# Patient Record
Sex: FEMALE | Race: WHITE | Employment: UNEMPLOYED | URBAN - METROPOLITAN AREA
[De-identification: names, ages, dates, MRNs, and addresses within clinical notes are randomized per-mention and may not be internally consistent; named-entity substitution may affect disease eponyms.]

---

## 2017-01-04 ENCOUNTER — ROUTINE PRENATAL (OUTPATIENT)
Dept: MIDWIFE SERVICES | Age: 27
End: 2017-01-04

## 2017-01-04 VITALS
TEMPERATURE: 98.1 F | DIASTOLIC BLOOD PRESSURE: 73 MMHG | WEIGHT: 153 LBS | SYSTOLIC BLOOD PRESSURE: 116 MMHG | HEIGHT: 61 IN | BODY MASS INDEX: 28.89 KG/M2 | HEART RATE: 92 BPM

## 2017-01-04 DIAGNOSIS — Z34.03 SUPERVISION OF NORMAL FIRST PREGNANCY IN THIRD TRIMESTER: Primary | ICD-10-CM

## 2017-01-04 NOTE — PROGRESS NOTES
Chief Complaint   Patient presents with    Routine Prenatal Visit     28w3d     Visit Vitals    /73    Pulse 92    Temp 98.1 °F (36.7 °C) (Oral)    Ht 5' 1\" (1.549 m)    Wt 153 lb (69.4 kg)    BMI 28.91 kg/m2     1. Have you been to the ER, urgent care clinic since your last visit? Hospitalized since your last visit? No    2. Have you seen or consulted any other health care providers outside of the [address] [place] [address] since your last visit? Include any pap smears or colon screening.  No

## 2017-01-04 NOTE — PROGRESS NOTES
S: Feeling well. No significant complaints or concerns. Reports consistent, daily fetal mvmt. No ctxs, LOF, or vaginal bldng. O: See prenatal flowsheet for maternal and fetal exam  Blood pressure 116/73, pulse 92, temperature 98.1 °F (36.7 °C), temperature source Oral, height 5' 1\" (1.549 m), weight 153 lb (69.4 kg), last menstrual period 2016. A:  28w3d  Size=Dates    P: Drank jazmine in office at 1422, sent down to [facility] at 1445 to have blood drawn for 1 hr GCT, ABS and CBC at 1522. push fluids. Discussed recommendation for flu vaccination during pregnancy including flu is more likely to cause severe illness in pregnant women than in women who are not pregnant,  getting a flu shot is the first and most important step in protecting against flu, the flu shot given during pregnancy has been shown to protect both the mother and her baby (up to 7 months old) from flu, and flu shots are a safe way to protect the mother and her unborn child from serious illness and complications of flu. Pt declines flu vaccine today. Pt given CDC Vaccine Information Statement   Discussed USPFT recommendation for Tdap during every pregnancy, optimal timing of administration between 27 to 36 weeks gestation-although can be done at any time during pregnancy, and that people that will be in close contact with her infant during the first year should also receive a Tdap vaccine if they have not previously received the Tdap vaccine. Pt states understanding. Pt will consider Tdap, declines vaccine today. Given information.   See prenatal checklist for other topics covered during today's visit  Recommend starting probiotics  RTO in 2 weeks for EDDIE with YEYO

## 2017-01-05 LAB
BASOPHILS # BLD AUTO: 0 X10E3/UL (ref 0–0.2)
BASOPHILS NFR BLD AUTO: 0 %
EOSINOPHIL # BLD AUTO: 0.1 X10E3/UL (ref 0–0.4)
EOSINOPHIL NFR BLD AUTO: 1 %
ERYTHROCYTE [DISTWIDTH] IN BLOOD BY AUTOMATED COUNT: 13.9 % (ref 12.3–15.4)
GTT GEST 2H PNL UR+SERPL: 127 MG/DL (ref 65–139)
HCT VFR BLD AUTO: 34.2 % (ref 34–46.6)
HGB BLD-MCNC: 11.2 G/DL (ref 11.1–15.9)
IMM GRANULOCYTES # BLD: 0 X10E3/UL (ref 0–0.1)
IMM GRANULOCYTES NFR BLD: 0 %
LYMPHOCYTES # BLD AUTO: 1.6 X10E3/UL (ref 0.7–3.1)
LYMPHOCYTES NFR BLD AUTO: 17 %
MCH RBC QN AUTO: 28.4 PG (ref 26.6–33)
MCHC RBC AUTO-ENTMCNC: 32.7 G/DL (ref 31.5–35.7)
MCV RBC AUTO: 87 FL (ref 79–97)
MONOCYTES # BLD AUTO: 0.5 X10E3/UL (ref 0.1–0.9)
MONOCYTES NFR BLD AUTO: 5 %
NEUTROPHILS # BLD AUTO: 7.1 X10E3/UL (ref 1.4–7)
NEUTROPHILS NFR BLD AUTO: 77 %
PLATELET # BLD AUTO: 270 X10E3/UL (ref 150–379)
RBC # BLD AUTO: 3.94 X10E6/UL (ref 3.77–5.28)
WBC # BLD AUTO: 9.2 X10E3/UL (ref 3.4–10.8)

## 2017-01-16 ENCOUNTER — ROUTINE PRENATAL (OUTPATIENT)
Dept: MIDWIFE SERVICES | Age: 27
End: 2017-01-16

## 2017-01-16 VITALS
DIASTOLIC BLOOD PRESSURE: 84 MMHG | HEIGHT: 61 IN | BODY MASS INDEX: 29.83 KG/M2 | SYSTOLIC BLOOD PRESSURE: 128 MMHG | WEIGHT: 158 LBS | HEART RATE: 98 BPM

## 2017-01-16 DIAGNOSIS — Z34.03 ENCOUNTER FOR SUPERVISION OF NORMAL FIRST PREGNANCY IN THIRD TRIMESTER: Primary | ICD-10-CM

## 2017-01-16 PROBLEM — Z34.00 NORMAL FIRST PREGNANCY CONFIRMED, ANTEPARTUM: Status: ACTIVE | Noted: 2017-01-16

## 2017-01-16 NOTE — PROGRESS NOTES
S: Feeling well. No significant complaints or concerns. Consistent, daily fetal mvmt. No ctxs, LOF, or vaginal bldng. Many questions about vaccines and placenta encapsulation. Would have like to have a home birth but not recommended because she lives so far from hospital.    O: See prenatal flowsheet for maternal and fetal exam  Blood pressure 128/84, pulse 98, height 5' 1\" (1.549 m), weight 158 lb (71.7 kg), last menstrual period 2016. A:  30w1d   Size=Dates    P: 3rd trimester packet given and reviewed  Reading list given; encouraged to read the vaccine book  Encouraged childbirth classes  Reviewed common pregnancy changes and discomfort as well as comfort measures.   See prenatal checklist for other topics covered during visit today  RTO in 2 weeks for EDDIE with YEYO

## 2017-01-16 NOTE — PATIENT INSTRUCTIONS
Weeks 30 to 32 of Your Pregnancy: Care Instructions  Your Care Instructions    You have made it to the final months of your pregnancy. By now, your baby is really starting to look like a baby, with hair and plump skin. As you enter the final weeks of pregnancy, the reality of having a baby may start to set in. This is the time to settle on a name, get your household in order, set up a safe nursery, and find quality  if needed. Doing these things in advance will allow you to focus on caring for and enjoying your new baby. You may also want to have a tour of your hospital's labor and delivery unit to get a better idea of what to expect while you are in the hospital.  During these last months, it is very important to take good care of yourself and pay attention to what your body needs. If your doctor says it is okay for you to work, don't push yourself too hard. Use the tips provided in this care sheet to ease heartburn and care for varicose veins. If you haven't already had the Tdap shot during this pregnancy, talk to your doctor about getting it. It will help protect your  against pertussis infection. Follow-up care is a key part of your treatment and safety. Be sure to make and go to all appointments, and call your doctor if you are having problems. It's also a good idea to know your test results and keep a list of the medicines you take. How can you care for yourself at home? Pay attention to your baby's movements  · You should feel your baby move several times every day. · Your baby now turns less, and kicks and jabs more. · Your baby sleeps 20 to 45 minutes at a time and is more active at certain times of day. · If your doctor wants you to count your baby's kicks:  ¨ Empty your bladder, and lie on your side or relax in a comfortable chair. ¨ Write down your start time. ¨ Pay attention only to your baby's movements. Count any movement except hiccups.   ¨ After you have counted 10 movements, write down your stop time. ¨ Write down how many minutes it took for your baby to move 10 times. ¨ If an hour goes by and you have not recorded 10 movements, have something to eat or drink and then count for another hour. If you do not record 10 movements in either hour, call your doctor. Ease heartburn  · Eat small, frequent meals. · Do not eat chocolate, peppermint, or very spicy foods. Avoid drinks with caffeine, such as coffee, tea, and sodas. · Avoid bending over or lying down after meals. · Talk a short walk after you eat. · If heartburn is a problem at night, do not eat for 2 hours before bedtime. · Take antacids like Mylanta, Maalox, Rolaids, or Tums. Do not take antacids that have sodium bicarbonate. Care for varicose veins  · Varicose veins are blood vessels that stretch out with the extra blood during pregnancy. Your legs may ache or throb. Most varicose veins will go away after the birth. · Avoid standing for long periods of time. Sit with your legs crossed at the ankles, not the knees. · Sit with your feet propped up. · Avoid tight clothing or stockings. Wear support hose. · Exercise regularly. Try walking for at least 30 minutes a day. Where can you learn more? Go to http://gabriel-deja.info/. Enter A852 in the search box to learn more about \"Weeks 30 to 32 of Your Pregnancy: Care Instructions. \"  Current as of: May 30, 2016  Content Version: 11.1  © 6123-7555 uTaP. Care instructions adapted under license by [employer] (which disclaims liability or warranty for this information). If you have questions about a medical condition or this instruction, always ask your healthcare professional. [place] [address] any warranty or liability for your use of this information. Here is a list and quick overview of some local childbirth educators, in no particular order:  (Disclaimer:  This list is, by no means, comprehensive! Please feel free to do more research, check into a class your friend has recommended, etc.!)    --------------------------------------------------------------------------------------------------------------  http://www.[employer]/  An overview of what Percy Dsouza is, and a list of educators in the area  ------------------------------------------------------------------------------------------------------------    ------------------------------------------------------------------------------------------------------------  [employer] of Josue Renteria 8709 Track to David Palafox gives you the tools and information you need to have a safe, rewarding un-medicated natural birth and does so in a shorter time frame. The fast track is an incredibly successful class and appeals to many couples because it gets you what you need without overwhelming your schedule and mind. Fast Track will teach you valuable strategies for laboring at home and evaluating whether it is time to head to the hospital. [facility] will also teach you about the stages of labor and how to support your partner in each stage from the first hint of labor to strong hard contractions to the focus and determination of the pushing stage. Fast Track covers helpful labor positions to ease discomfort and also help labor progress. The course emphasizes partnering with your provider. Most important, fast track focuses on coping techniques for handling the challenges that go with each stage of labor - espeically when things get intense. and unlike other birth classes, the fast Track class focuses on teaching you how to SUCCESSFULLY navigate some common obstacles and variations in labor (long labor, posterior or mal positioned baby, water breaking and no contratctions for several hourse, fast labor etc etc). http://www.antonia.maurisio/. com/  --------------------------------------------------------------------------------------------------------------------    --------------------------------------------------------------------------------------------------------------------  Brighter Birth  Dirk Tracy    A Brighter Birth 6 Week Series:    12 Hours of Childbirth Ed, broken down into 2 hour classes over 6 weeks  Thursday evenings - from 6:30-8:30pm or - afternoons - from 2-4pm or -  Recommended for First Time Parents and  Families, but not limited to  A fun, interactive, relaxed way of learning. Using multiple sensory techniques to engage the learner, the way YOU need to grasp the information. This class focuses on the Physiology of Birth, the [address] What's, How's, When's of Labor, Using your [address] [employer], the Hormonal Structure of- Labor, Birth, Breastfeeding, and Postpartum Phase, Comfort Measures, Coping Techniques, Patterned Breathing, Counter Pressure, Labor Positions, Acupressure for the Laboring Woman, the Partner's Role, the 's Role and How to be your own Advocate. Common Interventions- how to avoid them, what to try instead, and when they become medically necessary. Preparing to Breastfeed, the Latch, the \"Schedule\", the Normal Challenges of Breastfeeding and Breastfeeding Support. The First Week at Home with your Baby, your Partner and your \"New Normal\".    Access to a well honed list of local [facility] Pregnancy, Birth and Breastfeeding Resources  Packet of ABB Educational Materials Provided  $265 per couple [email or web address]  --------------------------------------------------------------------------------------------------------------------    ---------------------------------------------------------------------------------------------------------------------  [email or web address]  My Birth Class     Four week series, three hours each class, totaling 12 hours  A weston's guide to empowered, supported, evidence based birth practices, our original curriculum was created by the mal of [address] [place] [address] and reflects our 20+ years of combined childbirth experience. With the [address] [place] [address] childbirth education class, you will gain a thorough understanding of the mechanics of birth, comfort measures for pregnancy and labor, how to remain active and be your own advocate, breastfeeding basics, what to expect postpartum, and adjusting to life with a new baby. Though geared towards achieving an unmedicated birth, we will also thoroughly cover common interventions/medications, family-centered cesareans, and how to be flexible when confronted with a new direction in your birth's trajectory. A fun, interactive class with multiple approaches to learning. We pride ourselves on engaging our students and insuring that the information is interesting, accessible, and easy to remember. Approach your birth with calm and confidence. Educational materials provided. $250 per couple    [email or web address]/  -----------------------------------------------------------------------------------------------------------------    -----------------------------------------------------------------------------------------------------------------  [facility] Childbirth  QuietOptions.co.uk. html    [facility] classes are designed to provide comprehensive preparation for the childbearing year.    While classes are aimed at providing intensive support for parents who prefer low intervention birth and early parenting, our classes review current developments in birthing and infant feeding, medical options and the various alternatives available during and after the birth experience. Childbirth preparation classes meet for two hours once a week for six weeks, and are generally held on weekday evenings. You will have the best results if you plan on finishing your class series two to three weeks before your due date. Private classes are also available to accommodate individual needs and preferences. Our classes are held at medical and hospital locations around [facility]. Class size is limited to make sure you receive meaningful, individual attention. Fees vary according to class type; tuition for the six-week childbirth preparation series is $125, and scholarships are available. Please register early to make sure you are able to be in the class that is most appropriate for your due date. For more information, e-mail us  or call [phone] in [facility].  ---------------------------------------------------------------------------------------------------------------------    ---------------------------------------------------------------------------------------------------------------------  HypnoBirthing®  Childbirth Education/Classes    HypnoBirthing®, the Mongon Method, childbirth classes provide a wealth of information, you receive a book, CD's,and a packet. You and your birthing partner (whomever you choose that to be) will receive not only instruction on what to do in the midst of giving birth, but will be taught relaxation techniques for before, during and after birth. We help women learn to not resist but to \"surrender\" to what their bodies are doing and tune into their natural instincts. We go over the history of birth, how your uterus works, how our mind works and many more aspects of the birthing journey that is beneficial, as well as, vital to a healthy childbirth.  It is our belief that giving birth is a partnership, and partners will be given specific instruction. Each class will have a hypnosis session and the relaxation techniques will be taught to the birthing partner. This class is 5 sessions in length and each class duration is 2 1/2 hours long. [email or web address]  ------------------------------------------------------------------------------------------------------------------    ------------------------------------------------------------------------------------------------------------------

## 2017-01-16 NOTE — PROGRESS NOTES
Chief Complaint   Patient presents with    Routine Prenatal Visit     30w1d     Visit Vitals    /84    Pulse 98    Ht 5' 1\" (1.549 m)    Wt 158 lb (71.7 kg)    BMI 29.85 kg/m2     1. Have you been to the ER, urgent care clinic since your last visit? Hospitalized since your last visit? No    2. Have you seen or consulted any other health care providers outside of the [address] [place] [address] since your last visit? Include any pap smears or colon screening. No     Here today for a routine prenatal visit and she has no complaints or concerns.

## 2017-01-30 ENCOUNTER — ROUTINE PRENATAL (OUTPATIENT)
Dept: MIDWIFE SERVICES | Age: 27
End: 2017-01-30

## 2017-01-30 VITALS
HEIGHT: 61 IN | DIASTOLIC BLOOD PRESSURE: 67 MMHG | TEMPERATURE: 97.7 F | WEIGHT: 156.5 LBS | SYSTOLIC BLOOD PRESSURE: 101 MMHG | BODY MASS INDEX: 29.55 KG/M2 | HEART RATE: 104 BPM

## 2017-01-30 DIAGNOSIS — R12 HEARTBURN DURING PREGNANCY, THIRD TRIMESTER: ICD-10-CM

## 2017-01-30 DIAGNOSIS — O26.893 HEARTBURN DURING PREGNANCY, THIRD TRIMESTER: ICD-10-CM

## 2017-01-30 DIAGNOSIS — Z34.03 ENCOUNTER FOR SUPERVISION OF NORMAL FIRST PREGNANCY IN THIRD TRIMESTER: Primary | ICD-10-CM

## 2017-01-30 NOTE — PROGRESS NOTES
Chief Complaint   Patient presents with    Routine Prenatal Visit     32w1d     Visit Vitals    /67    Pulse (!) 104    Temp 97.7 °F (36.5 °C) (Oral)    Ht 5' 1\" (1.549 m)    Wt 156 lb 8 oz (71 kg)    BMI 29.57 kg/m2     1. Have you been to the ER, urgent care clinic since your last visit? Hospitalized since your last visit? No    2. Have you seen or consulted any other health care providers outside of the [address] [place] [address] since your last visit? Include any pap smears or colon screening.  No

## 2017-01-30 NOTE — PROGRESS NOTES
S: Feeling well. No significant complaints or concerns except heartburn at night. Has been taking baking soda at times with relief, since she does not want to use any medication. Reports consistent, daily fetal mvmt. No ctxs, LOF, or vaginal bldng. O: See prenatal flowsheet for maternal and fetal exam  Blood pressure 101/67, pulse (!) 104, temperature 97.7 °F (36.5 °C), temperature source Oral, height 5' 1\" (1.549 m), weight 156 lb 8 oz (71 kg), last menstrual period 2016. Poor weight gain noted, 6 lbs since pregnancy onset. Reviewed heartburn and non-medicinal relief measures. Reviewed TUMS as good calcium source and fine in moderation. A:  32w1d  Size=Dates    P: TUMS in moderation. Stop baking soda. Protein shakes encouraged. Labs reviewed.   See prenatal checklist for other topics covered during today's visit  RTO in 2 weeks for EDDIE with YEYO

## 2017-02-14 ENCOUNTER — ROUTINE PRENATAL (OUTPATIENT)
Dept: MIDWIFE SERVICES | Age: 27
End: 2017-02-14

## 2017-02-14 VITALS
TEMPERATURE: 98.2 F | HEART RATE: 93 BPM | SYSTOLIC BLOOD PRESSURE: 110 MMHG | DIASTOLIC BLOOD PRESSURE: 67 MMHG | BODY MASS INDEX: 29.83 KG/M2 | WEIGHT: 158 LBS | HEIGHT: 61 IN

## 2017-02-14 DIAGNOSIS — Z34.03 ENCOUNTER FOR SUPERVISION OF NORMAL FIRST PREGNANCY IN THIRD TRIMESTER: Primary | ICD-10-CM

## 2017-02-14 NOTE — PROGRESS NOTES
S: Feeling well. No significant complaints or concerns. Reports consistent, daily fetal mvmt. No ctxs, LOF, or vaginal bldng. Baby very active at night. O: See prenatal flowsheet for maternal and fetal exam  Blood pressure 110/67, pulse 93, temperature 98.2 °F (36.8 °C), temperature source Oral, height 5' 1\" (1.549 m), weight 158 lb (71.7 kg), last menstrual period 2016. A:  34w2d   Size=Dates    P: Excited since got car seat. See prenatal checklist for other topics covered during today's visit  GBS at next visit.     RTO in 2 weeks for EDDIE with YEYO

## 2017-02-14 NOTE — PROGRESS NOTES
Chief Complaint   Patient presents with    Routine Prenatal Visit     34w2d     Visit Vitals    /67    Pulse 93    Temp 98.2 °F (36.8 °C) (Oral)    Ht 5' 1\" (1.549 m)    Wt 158 lb (71.7 kg)    BMI 29.85 kg/m2     1. Have you been to the ER, urgent care clinic since your last visit? Hospitalized since your last visit? No    2. Have you seen or consulted any other health care providers outside of the [facility] since your last visit? Include any pap smears or colon screening.  No

## 2017-02-14 NOTE — MR AVS SNAPSHOT
Visit Information Date & Time Provider Department Dept. Phone Encounter #  
 2/14/2017  1:30 PM Catherine BrabaRonny 6 [id number] Your Appointments 2/28/2017  3:00 PM  
OB VISIT with Fanny Howell CNM [address] ([facility]) Appt Note: ret ob  
 [address] [facility] [address] [facility]  
[phone]  
  
   
 [address] [facility] [address] [facility] [address] [address] Upcoming Health Maintenance Date Due  
 HPV AGE 9Y-34Y (1 of 3 - Female 3 Dose Series) 4/12/2001 PAP AKA CERVICAL CYTOLOGY 9/22/2019 Allergies as of 2/14/2017  Review Complete On: 2/14/2017 By: Catherine Barba CNM No Known Allergies Current Immunizations  Never Reviewed No immunizations on file. Not reviewed this visit You Were Diagnosed With   
  
 Codes Comments Encounter for supervision of normal first pregnancy in third trimester    -  Primary ICD-10-CM: Z34.03 
ICD-9-CM: V22.0 Vitals BP Pulse Temp Height(growth percentile) Weight(growth percentile) LMP  
 110/67 93 98.2 °F (36.8 °C) (Oral) 5' 1\" (1.549 m) 158 lb (71.7 kg) 06/19/2016 (Approximate) BMI OB Status Smoking Status 29.85 kg/m2 Pregnant Never Smoker BMI and BSA Data Body Mass Index Body Surface Area  
 29.85 kg/m 2 1.76 m 2 Preferred Pharmacy Pharmacy Name Phone [facility] [address] ([address] [phone] Your Updated Medication List  
  
   
This list is accurate as of: 2/14/17  2:16 PM.  Always use your most recent med list.  
  
  
  
  
 PRENATAL DHA+COMPLETE PRENATAL -300 mg-mcg-mg Cmpk Generic drug:  PNV no.24-iron-folic acid-dha Take  by mouth. Introducing [facility]!    
 Vane Merlos introduces exoro system patient portal. Now you can access parts of your medical record, email your doctor's office, and request medication refills online. 1. In your internet browser, go to [email or web address] 2. Click on the First Time User? Click Here link in the Sign In box. You will see the New Member Sign Up page. 3. Enter your [facility] Access Code exactly as it appears below. You will not need to use this code after youve completed the sign-up process. If you do not sign up before the expiration date, you must request a new code. · [facility] Access Code: [email or web address] Expires: 5/15/2017  1:33 PM 
 
4. Enter the last four digits of your Social Security Number (xxxx) and Date of Birth (mm/dd/yyyy) as indicated and click Submit. You will be taken to the next sign-up page. 5. Create a [facility] [employer] ID. This will be your [facility] login ID and cannot be changed, so think of one that is secure and easy to remember. 6. Create a [facility] [employer] password. You can change your password at any time. 7. Enter your Password Reset Question and Answer. This can be used at a later time if you forget your password. 8. Enter your e-mail address. You will receive e-mail notification when new information is available in [address] 9. Click Sign Up. You can now view and download portions of your medical record. 10. Click the Download Summary menu link to download a portable copy of your medical information. If you have questions, please visit the Frequently Asked Questions section of the [facility] website. Remember, [facility] [employer] is NOT to be used for urgent needs. For medical emergencies, dial 911. Now available from your iPhone and Android! Please provide this summary of care documentation to your next provider. Your primary care clinician is listed as NONE. If you have any questions after today's visit, please call [phone].

## 2017-03-03 ENCOUNTER — ROUTINE PRENATAL (OUTPATIENT)
Dept: MIDWIFE SERVICES | Age: 27
End: 2017-03-03

## 2017-03-03 VITALS
SYSTOLIC BLOOD PRESSURE: 133 MMHG | TEMPERATURE: 97.8 F | WEIGHT: 162 LBS | DIASTOLIC BLOOD PRESSURE: 85 MMHG | HEART RATE: 97 BPM | HEIGHT: 61 IN | BODY MASS INDEX: 30.58 KG/M2

## 2017-03-03 DIAGNOSIS — Z34.03 ENCOUNTER FOR SUPERVISION OF NORMAL FIRST PREGNANCY IN THIRD TRIMESTER: Primary | ICD-10-CM

## 2017-03-03 NOTE — PROGRESS NOTES
Chief Complaint   Patient presents with    Routine Prenatal Visit     36w5d     Visit Vitals    /85    Pulse 97    Temp 97.8 °F (36.6 °C) (Oral)    Ht 5' 1\" (1.549 m)    Wt 162 lb (73.5 kg)    BMI 30.61 kg/m2     1. Have you been to the ER, urgent care clinic since your last visit? Hospitalized since your last visit? No    2. Have you seen or consulted any other health care providers outside of the [address] [place] [address] since your last visit? Include any pap smears or colon screening.  No

## 2017-03-03 NOTE — PROGRESS NOTES
S: Feeling well. No significant complaints or concerns. Consistent, daily fetal mvmt. No ctxs, LOF, or vaginal bldng. Very excited for the birth. Watched the movie the business of being born. Elizabeth have some disagreements about who should attend the birth with them. They will discuss it further and come to an agreement. Her sister, mother and his mother would like to come. O: See prenatal flowsheet for maternal and fetal exam  Blood pressure 133/85, pulse 97, temperature 97.8 °F (36.6 °C), temperature source Oral, height 5' 1\" (1.549 m), weight 162 lb (73.5 kg), last menstrual period 2016. A:  36w5d   Size=Dates    P: Bring birth plan to next visit  GBS today  Reviewed common pregnancy changes and discomfort as well as comfort measures.   See prenatal checklist for other topics covered during visit today  RTO in 1 weeks for EDDIE with YEYO

## 2017-03-03 NOTE — MR AVS SNAPSHOT
Visit Information Date & Time Provider Department Dept. Phone Encounter #  
 3/3/2017 10:00 AM Carlos CameronRonny 6 [id number] Follow-up Instructions Return in about 1 week (around 3/10/2017). Follow-up and Disposition History Upcoming Health Maintenance Date Due  
 HPV AGE 9Y-34Y (1 of 3 - Female 3 Dose Series) 4/12/2001 PAP AKA CERVICAL CYTOLOGY 9/22/2019 Allergies as of 3/3/2017  Review Complete On: 3/3/2017 By: Carlos Cameron CNM No Known Allergies Current Immunizations  Never Reviewed No immunizations on file. Not reviewed this visit You Were Diagnosed With   
  
 Codes Comments Encounter for supervision of normal first pregnancy in third trimester    -  Primary ICD-10-CM: Z34.03 
ICD-9-CM: V22.0 Vitals BP  
  
  
  
  
  
 133/85 BMI and BSA Data Body Mass Index Body Surface Area  
 30.61 kg/m 2 1.78 m 2 Preferred Pharmacy Pharmacy Name Phone 310 [facility], [address] ([address] [phone] Your Updated Medication List  
  
   
This list is accurate as of: 3/3/17 10:38 AM.  Always use your most recent med list.  
  
  
  
  
 PRENATAL DHA+COMPLETE PRENATAL -300 mg-mcg-mg Cmpk Generic drug:  PNV no.24-iron-folic acid-dha Take  by mouth. Follow-up Instructions Return in about 1 week (around 3/10/2017). Introducing [facility] & HEALTH SERVICES! Stacy Singer introduces Continuent patient portal. Now you can access parts of your medical record, email your doctor's office, and request medication refills online. 1. In your internet browser, go to [email or web address] 2. Click on the First Time User? Click Here link in the Sign In box. You will see the New Member Sign Up page. 3. Enter your Continuent Access Code exactly as it appears below.  You will not need to use this code after youve completed the sign-up process. If you do not sign up before the expiration date, you must request a new code. · Mobilitus Access Code: [email or web address] Expires: 5/15/2017  1:33 PM 
 
4. Enter the last four digits of your Social Security Number (xxxx) and Date of Birth (mm/dd/yyyy) as indicated and click Submit. You will be taken to the next sign-up page. 5. Create a Mobilitus ID. This will be your Mobilitus login ID and cannot be changed, so think of one that is secure and easy to remember. 6. Create a Mobilitus password. You can change your password at any time. 7. Enter your Password Reset Question and Answer. This can be used at a later time if you forget your password. 8. Enter your e-mail address. You will receive e-mail notification when new information is available in [address] 9. Click Sign Up. You can now view and download portions of your medical record. 10. Click the Download Summary menu link to download a portable copy of your medical information. If you have questions, please visit the Frequently Asked Questions section of the Mobilitus website. Remember, Mobilitus is NOT to be used for urgent needs. For medical emergencies, dial 911. Now available from your iPhone and Android! Please provide this summary of care documentation to your next provider. Your primary care clinician is listed as NONE. If you have any questions after today's visit, please call [phone].

## 2017-03-05 LAB
GP B STREP DNA SPEC QL NAA+PROBE: POSITIVE
GRBS, EXTERNAL: POSITIVE

## 2017-03-10 ENCOUNTER — ROUTINE PRENATAL (OUTPATIENT)
Dept: MIDWIFE SERVICES | Age: 27
End: 2017-03-10

## 2017-03-10 VITALS
WEIGHT: 164 LBS | BODY MASS INDEX: 30.96 KG/M2 | DIASTOLIC BLOOD PRESSURE: 74 MMHG | HEART RATE: 104 BPM | SYSTOLIC BLOOD PRESSURE: 122 MMHG | TEMPERATURE: 98.4 F | HEIGHT: 61 IN

## 2017-03-10 DIAGNOSIS — Z34.03 ENCOUNTER FOR SUPERVISION OF NORMAL FIRST PREGNANCY IN THIRD TRIMESTER: Primary | ICD-10-CM

## 2017-03-10 DIAGNOSIS — Z23 ENCOUNTER FOR IMMUNIZATION: ICD-10-CM

## 2017-03-10 NOTE — PROGRESS NOTES
After obtaining consent, and per orders of [facility] CNM, injection of tdap given in right arm by Andi rCaig RN. Patient instructed to remain in clinic for 20 minutes afterwards, and to report any adverse reaction to me immediately. Lot: [id number] Exp: 5/20/19 ND: 58516-989-83 , VIS given.

## 2017-03-10 NOTE — PROGRESS NOTES
S: Feeling well. No significant complaints or concerns. Reports consistent, daily fetal mvmt. No ctxs, LOF, or vaginal bldng. Here with boyfriend Yousuf Holloway. Reports gradual lightening. O: See prenatal flowsheet for maternal and fetal exam  Blood pressure 122/74, pulse (!) 104, temperature 98.4 °F (36.9 °C), temperature source Oral, height 5' 1\" (1.549 m), weight 164 lb (74.4 kg), last menstrual period 2016. A:  37w5d  Size=Dates    P: Discussed USPFT recommendation for Tdap during every pregnancy, optimal timing of administration between 27 to 36 weeks gestation-although can be done at any time during pregnancy, and that people that will be in close contact with her infant during the first year should also receive a Tdap vaccine if they have not previously received the Tdap vaccine. Pt states understanding. Pt accepts Tdap vaccine today.   Plans Mirena IUD pp- given pre-auth info  Plans breastfeeding  See prenatal checklist for other topics covered during today's visit  RTO in 1 weeks for EDDIE with YEYO

## 2017-03-10 NOTE — MR AVS SNAPSHOT
Visit Information Date & Time Provider Department Dept. Phone Encounter #  
 3/10/2017  3:00 PM Talia Alberta, [address] [facility] [employer] [phone] [id number] Follow-up Instructions Return in about 1 week (around 3/17/2017) for rtn ob with neeraj. Your Appointments 3/17/2017  1:30 PM  
OB VISIT with Gayla Madrigal CNM [address] [facility] Appt Note: ret ob  
 [address]. [facility] [address] [facility]  
[phone]  
  
   
 [address] [facility]. [facility] [address] [facility] Upcoming Health Maintenance Date Due  
 HPV AGE 9Y-34Y (1 of 3 - Female 3 Dose Series) 4/12/2001 PAP AKA CERVICAL CYTOLOGY 9/22/2019 Allergies as of 3/10/2017  Review Complete On: 3/10/2017 By: Talia Cantu CNM No Known Allergies Current Immunizations  Never Reviewed Name Date Tdap  Incomplete Not reviewed this visit You Were Diagnosed With   
  
 Codes Comments Encounter for immunization    -  Primary ICD-10-CM: G25 ICD-9-CM: V03.89 Vitals BP Pulse Temp Height(growth percentile) Weight(growth percentile) LMP  
 122/74 (!) 104 98.4 °F (36.9 °C) (Oral) 5' 1\" (1.549 m) 164 lb (74.4 kg) 06/19/2016 (Approximate) BMI OB Status Smoking Status 30.99 kg/m2 Pregnant Never Smoker BMI and BSA Data Body Mass Index Body Surface Area 30.99 kg/m 2 1.79 m 2 Preferred Pharmacy Pharmacy Name Phone [address] [facility], [facility] 53 [address] ([address] [phone] Your Updated Medication List  
  
   
This list is accurate as of: 3/10/17  3:52 PM.  Always use your most recent med list.  
  
  
  
  
 PRENATAL DHA+COMPLETE PRENATAL -300 mg-mcg-mg Cmpk Generic drug:  PNV no.24-iron-folic acid-dha Take  by mouth. We Performed the Following CA IMMUNIZ ADMIN,1 SINGLE/COMB VAC/TOXOID G4117710 CPT(R)] TETANUS, DIPHTHERIA TOXOIDS AND ACELLULAR PERTUSSIS VACCINE (TDAP), IN INDIVIDS. >=7, IM [id number] CPT(R)] Follow-up Instructions Return in about 1 week (around 3/17/2017) for rtn ob with cnm. Introducing [facility]! [facility] introduces Meal Ticket patient portal. Now you can access parts of your medical record, email your doctor's office, and request medication refills online. 1. In your internet browser, go to [email or web address] 2. Click on the First Time User? Click Here link in the Sign In box. You will see the New Member Sign Up page. 3. Enter your Meal Ticket Access Code exactly as it appears below. You will not need to use this code after youve completed the sign-up process. If you do not sign up before the expiration date, you must request a new code. · Meal Ticket Access Code: [email or web address] Expires: 5/15/2017  1:33 PM 
 
4. Enter the last four digits of your Social Security Number (xxxx) and Date of Birth (mm/dd/yyyy) as indicated and click Submit. You will be taken to the next sign-up page. 5. Create a Meal Ticket ID. This will be your Meal Ticket login ID and cannot be changed, so think of one that is secure and easy to remember. 6. Create a Meal Ticket password. You can change your password at any time. 7. Enter your Password Reset Question and Answer. This can be used at a later time if you forget your password. 8. Enter your e-mail address. You will receive e-mail notification when new information is available in [address] 9. Click Sign Up. You can now view and download portions of your medical record. 10. Click the Download Summary menu link to download a portable copy of your medical information. If you have questions, please visit the Frequently Asked Questions section of the Meal Ticket website. Remember, Meal Ticket is NOT to be used for urgent needs. For medical emergencies, dial 911. Now available from your iPhone and Android! Please provide this summary of care documentation to your next provider. Your primary care clinician is listed as NONE. If you have any questions after today's visit, please call [phone].

## 2017-03-10 NOTE — PROGRESS NOTES
No chief complaint on file. Visit Vitals    /74    Pulse (!) 104    Temp 98.4 °F (36.9 °C) (Oral)    Ht 5' 1\" (1.549 m)    Wt 164 lb (74.4 kg)    BMI 30.99 kg/m2     1. Have you been to the ER, urgent care clinic since your last visit? Hospitalized since your last visit? No    2. Have you seen or consulted any other health care providers outside of the [address] [place] [address] since your last visit? Include any pap smears or colon screening.  No

## 2017-03-16 ENCOUNTER — ROUTINE PRENATAL (OUTPATIENT)
Dept: MIDWIFE SERVICES | Age: 27
End: 2017-03-16

## 2017-03-16 VITALS
BODY MASS INDEX: 30.96 KG/M2 | DIASTOLIC BLOOD PRESSURE: 86 MMHG | HEIGHT: 61 IN | HEART RATE: 89 BPM | WEIGHT: 164 LBS | TEMPERATURE: 98.4 F | SYSTOLIC BLOOD PRESSURE: 126 MMHG

## 2017-03-16 DIAGNOSIS — Z34.03 ENCOUNTER FOR SUPERVISION OF NORMAL FIRST PREGNANCY IN THIRD TRIMESTER: ICD-10-CM

## 2017-03-16 DIAGNOSIS — O12.03 EDEMA DURING PREGNANCY IN THIRD TRIMESTER: Primary | ICD-10-CM

## 2017-03-16 LAB
BILIRUB UR QL STRIP: NEGATIVE
GLUCOSE UR-MCNC: NEGATIVE MG/DL
KETONES P FAST UR STRIP-MCNC: NEGATIVE MG/DL
PH UR STRIP: 7.5 [PH] (ref 4.6–8)
PROT UR QL STRIP: NORMAL MG/DL
SP GR UR STRIP: 1.02 (ref 1–1.03)
UA UROBILINOGEN AMB POC: NORMAL (ref 0.2–1)
URINALYSIS CLARITY POC: CLEAR
URINALYSIS COLOR POC: YELLOW
URINE BLOOD POC: NORMAL
URINE LEUKOCYTES POC: NORMAL
URINE NITRITES POC: NEGATIVE

## 2017-03-16 NOTE — MR AVS SNAPSHOT
Visit Information Date & Time Provider Department Dept. Phone Encounter #  
 3/16/2017  1:30 PM Ronny Martinez 6 [id number] Your Appointments 3/24/2017  9:00 AM  
OB VISIT with Kristopher Murphy CNM [address] ([address]) Appt Note: ret ob  
 [address] [place] [address] [facility]  
[phone]  
  
   
 [address] [facility] [address] [place] [address] [address] Upcoming Health Maintenance Date Due  
 HPV AGE 9Y-34Y (1 of 3 - Female 3 Dose Series) 4/12/2001 PAP AKA CERVICAL CYTOLOGY 9/22/2019 Allergies as of 3/16/2017  Review Complete On: 3/16/2017 By: Aleja Shirley CNM No Known Allergies Current Immunizations  Never Reviewed Name Date Tdap 3/10/2017 Not reviewed this visit You Were Diagnosed With   
  
 Codes Comments Edema during pregnancy in third trimester    -  Primary ICD-10-CM: O12.03 
ICD-9-CM: 646.13 Encounter for supervision of normal first pregnancy in third trimester     ICD-10-CM: Z34.03 
ICD-9-CM: V22.0 Vitals BP Pulse Temp Height(growth percentile) Weight(growth percentile) LMP  
 126/86 89 98.4 °F (36.9 °C) (Oral) 5' 1\" (1.549 m) 164 lb (74.4 kg) 06/19/2016 (Approximate) BMI OB Status Smoking Status 30.99 kg/m2 Pregnant Never Smoker Vitals History BMI and BSA Data Body Mass Index Body Surface Area 30.99 kg/m 2 1.79 m 2 Preferred Pharmacy Pharmacy Name Phone 310 [facility], [facility] 53 [address] ([address] [phone] Your Updated Medication List  
  
   
This list is accurate as of: 3/16/17  2:19 PM.  Always use your most recent med list.  
  
  
  
  
 PRENATAL DHA+COMPLETE PRENATAL -300 mg-mcg-mg Cmpk Generic drug:  PNV no.24-iron-folic acid-dha Take  by mouth. We Performed the Following PROT+CREATU (RANDOM) [id number] CPT(R)] Introducing [facility] & HEALTH SERVICES! Aileen Colby introduces EdÃºkame patient portal. Now you can access parts of your medical record, email your doctor's office, and request medication refills online. 1. In your internet browser, go to [email or web address] 2. Click on the First Time User? Click Here link in the Sign In box. You will see the New Member Sign Up page. 3. Enter your EdÃºkame Access Code exactly as it appears below. You will not need to use this code after youve completed the sign-up process. If you do not sign up before the expiration date, you must request a new code. · EdÃºkame Access Code: [email or web address] Expires: 5/15/2017  2:33 PM 
 
4. Enter the last four digits of your Social Security Number (xxxx) and Date of Birth (mm/dd/yyyy) as indicated and click Submit. You will be taken to the next sign-up page. 5. Create a EdÃºkame ID. This will be your EdÃºkame login ID and cannot be changed, so think of one that is secure and easy to remember. 6. Create a EdÃºkame password. You can change your password at any time. 7. Enter your Password Reset Question and Answer. This can be used at a later time if you forget your password. 8. Enter your e-mail address. You will receive e-mail notification when new information is available in [address] 9. Click Sign Up. You can now view and download portions of your medical record. 10. Click the Download Summary menu link to download a portable copy of your medical information. If you have questions, please visit the Frequently Asked Questions section of the EdÃºkame website. Remember, EdÃºkame is NOT to be used for urgent needs. For medical emergencies, dial 911. Now available from your iPhone and Android! Please provide this summary of care documentation to your next provider. Your primary care clinician is listed as NONE.  If you have any questions after today's visit, please call [phone].

## 2017-03-16 NOTE — PROGRESS NOTES
S: Feeling well. No significant complaints or concerns except increased edema and difficulty getting rings off. Reports consistent, daily fetal mvmt. No ctxs, LOF, or vaginal bldng. Denies HA, visual changes. Asking about travel 5 hours away to cousin baby shower tomorrow. O: See prenatal flowsheet for maternal and fetal exam  Blood pressure 126/86, pulse 89, temperature 98.4 °F (36.9 °C), temperature source Oral, height 5' 1\" (1.549 m), weight 164 lb (74.4 kg), last menstrual period 2016. BP with mild elevation for [facility]. 30 protein on clinic UA.     A:  38w4d   Size=Dates    P: Precautions reviewed. Advised to delay travel until after delivery and cleared to travel with . See prenatal checklist for other topics covered during today's visit  Urine for protein/creat ratio.   RTO in 1 weeks for EDDIE with CNM

## 2017-03-16 NOTE — PROGRESS NOTES
Chief Complaint   Patient presents with    Routine Prenatal Visit     38w 4d, c/o LE swelling     Visit Vitals    /86    Pulse 89    Temp 98.4 °F (36.9 °C) (Oral)    Ht 5' 1\" (1.549 m)    Wt 164 lb (74.4 kg)    LMP 06/19/2016 (Approximate)    BMI 30.99 kg/m2     1. Have you been to the ER, urgent care clinic since your last visit? Hospitalized since your last visit? No    2. Have you seen or consulted any other health care providers outside of the [address] [place] [address] since your last visit? Include any pap smears or colon screening.  No

## 2017-03-17 LAB
CREAT UR-MCNC: 113.9 MG/DL
PROT UR-MCNC: 18.9 MG/DL
PROT/CREAT UR: 166 MG/G CREAT (ref 0–200)

## 2017-03-22 ENCOUNTER — TELEPHONE (OUTPATIENT)
Dept: MIDWIFE SERVICES | Age: 27
End: 2017-03-22

## 2017-03-22 NOTE — TELEPHONE ENCOUNTER
Pt requesting a breast pump Rx be faxed to [address] [place] [address] at fax # [phone]. Pt did not specify which one she would like so if you can call to discuss the different types she can get and also call once faxed.

## 2017-03-22 NOTE — TELEPHONE ENCOUNTER
Spoke with Marquis Michaels and discussed breast pumps. Recommended using Framebench website to show all options available. Verbalized understanding.

## 2017-03-22 NOTE — PROGRESS NOTES
Message left for [facility] to call clinic. Protein in urine was normal level and not concerning at this time. She can be informed if she calls back or I can talk to her for any questions.

## 2017-03-24 ENCOUNTER — ROUTINE PRENATAL (OUTPATIENT)
Dept: MIDWIFE SERVICES | Age: 27
End: 2017-03-24

## 2017-03-24 VITALS
WEIGHT: 165 LBS | SYSTOLIC BLOOD PRESSURE: 121 MMHG | DIASTOLIC BLOOD PRESSURE: 79 MMHG | TEMPERATURE: 97.6 F | HEART RATE: 100 BPM | BODY MASS INDEX: 31.15 KG/M2 | HEIGHT: 61 IN

## 2017-03-24 DIAGNOSIS — Z34.03 ENCOUNTER FOR SUPERVISION OF NORMAL FIRST PREGNANCY IN THIRD TRIMESTER: Primary | ICD-10-CM

## 2017-03-24 NOTE — PROGRESS NOTES
S: Feeling well. No significant complaints or concerns. Consistent, daily fetal mvmt. No ctxs, LOF, or vaginal bldng. Suffering with carpel tunnel in right hand. Ready for baby. Will have car seat installed today. O: See prenatal flowsheet for maternal and fetal exam  Blood pressure 121/79, pulse 100, temperature 97.6 °F (36.4 °C), temperature source Oral, height 5' 1\" (1.549 m), weight 165 lb (74.8 kg), last menstrual period 2016. A:  39w5d   Size=Dates    P: wrist brace  Reviewed labor precautions  Post dates BPP scheduled  Discussed post dates management  Reviewed common pregnancy changes and discomfort as well as comfort measures.   See prenatal checklist for other topics covered during visit today  RTO in 1 weeks for EDDIE with YEYO

## 2017-03-24 NOTE — PROGRESS NOTES
Chief Complaint   Patient presents with    Routine Prenatal Visit     39w5d     Visit Vitals    /79    Pulse 100    Temp 97.6 °F (36.4 °C) (Oral)    Ht 5' 1\" (1.549 m)    Wt 165 lb (74.8 kg)    BMI 31.18 kg/m2     1. Have you been to the ER, urgent care clinic since your last visit? Hospitalized since your last visit? No    2. Have you seen or consulted any other health care providers outside of the [address] [place] [address] since your last visit? Include any pap smears or colon screening.  No

## 2017-03-24 NOTE — MR AVS SNAPSHOT
Visit Information Date & Time Provider Department Dept. Phone Encounter #  
 3/24/2017  9:00 AM Ewa CarmonaRonny 6 [id number] Your Appointments 3/31/2017  9:00 AM  
OB VISIT with Yogi Mcdaniels CNM [address] [facility] ([facility]) Appt Note: ret ob  
 [address]. [place] [address] [facility]  
[phone]  
  
   
 [address]. [place] [address] [address]  
  
    
 4/3/2017  3:00 PM  
PROCEDURE with Ernesto Kaplan MD  
[address] [facility] Appt Note: bpp  
 [address] [place] [address] [facility]  
[phone]  
  
   
 [address] [place] [address] [facility] [address] Upcoming Health Maintenance Date Due  
 HPV AGE 9Y-34Y (1 of 3 - Female 3 Dose Series) 4/12/2001 PAP AKA CERVICAL CYTOLOGY 9/22/2019 Allergies as of 3/24/2017  Review Complete On: 3/24/2017 By: Ewa Carmona CNM No Known Allergies Current Immunizations  Never Reviewed Name Date Tdap 3/10/2017 Not reviewed this visit Vitals BP Pulse Temp Height(growth percentile) Weight(growth percentile) LMP  
 121/79 100 97.6 °F (36.4 °C) (Oral) 5' 1\" (1.549 m) 165 lb (74.8 kg) 06/19/2016 (Approximate) BMI OB Status Smoking Status 31.18 kg/m2 Pregnant Never Smoker BMI and BSA Data Body Mass Index Body Surface Area  
 31.18 kg/m 2 1.79 m 2 Preferred Pharmacy Pharmacy Name Phone 310 [facility], [facility] 53 91 [facility] OF [address] [place] [address] ([address] [phone] Your Updated Medication List  
  
   
This list is accurate as of: 3/24/17  9:48 AM.  Always use your most recent med list.  
  
  
  
  
 PRENATAL DHA+COMPLETE PRENATAL -300 mg-mcg-mg Cmpk Generic drug:  PNV no.24-iron-folic acid-dha Take  by mouth. Introducing [facility] & HEALTH SERVICES! Lilly Valencia introduces Binary Event Network patient portal. Now you can access parts of your medical record, email your doctor's office, and request medication refills online. 1. In your internet browser, go to [email or web address] 2. Click on the First Time User? Click Here link in the Sign In box. You will see the New Member Sign Up page. 3. Enter your Binary Event Network Access Code exactly as it appears below. You will not need to use this code after youve completed the sign-up process. If you do not sign up before the expiration date, you must request a new code. · Binary Event Network Access Code: [email or web address] Expires: 5/15/2017  2:33 PM 
 
4. Enter the last four digits of your Social Security Number (xxxx) and Date of Birth (mm/dd/yyyy) as indicated and click Submit. You will be taken to the next sign-up page. 5. Create a Binary Event Network ID. This will be your Binary Event Network login ID and cannot be changed, so think of one that is secure and easy to remember. 6. Create a Binary Event Network password. You can change your password at any time. 7. Enter your Password Reset Question and Answer. This can be used at a later time if you forget your password. 8. Enter your e-mail address. You will receive e-mail notification when new information is available in [address] 9. Click Sign Up. You can now view and download portions of your medical record. 10. Click the Download Summary menu link to download a portable copy of your medical information. If you have questions, please visit the Frequently Asked Questions section of the Binary Event Network website. Remember, Binary Event Network is NOT to be used for urgent needs. For medical emergencies, dial 911. Now available from your iPhone and Android! Please provide this summary of care documentation to your next provider. Your primary care clinician is listed as NONE. If you have any questions after today's visit, please call [phone].

## 2017-03-31 ENCOUNTER — ROUTINE PRENATAL (OUTPATIENT)
Dept: MIDWIFE SERVICES | Age: 27
End: 2017-03-31

## 2017-03-31 ENCOUNTER — HOSPITAL ENCOUNTER (INPATIENT)
Age: 27
LOS: 3 days | Discharge: HOME | DRG: 560 | End: 2017-04-03
Attending: OBSTETRICS & GYNECOLOGY | Admitting: ADVANCED PRACTICE MIDWIFE
Payer: MEDICAID

## 2017-03-31 VITALS
SYSTOLIC BLOOD PRESSURE: 127 MMHG | HEART RATE: 89 BPM | DIASTOLIC BLOOD PRESSURE: 84 MMHG | BODY MASS INDEX: 31.53 KG/M2 | TEMPERATURE: 98.2 F | HEIGHT: 61 IN | WEIGHT: 167 LBS

## 2017-03-31 DIAGNOSIS — Z3A.40 40 WEEKS GESTATION OF PREGNANCY: ICD-10-CM

## 2017-03-31 DIAGNOSIS — O48.0 POST-TERM PREGNANCY, 40-42 WEEKS OF GESTATION: Primary | ICD-10-CM

## 2017-03-31 LAB
BASOPHILS # BLD AUTO: 0 K/UL (ref 0–0.1)
BASOPHILS # BLD: 0 % (ref 0–1)
EOSINOPHIL # BLD: 0.1 K/UL (ref 0–0.4)
EOSINOPHIL NFR BLD: 1 % (ref 0–7)
ERYTHROCYTE [DISTWIDTH] IN BLOOD BY AUTOMATED COUNT: 15.1 % (ref 11.5–14.5)
HCT VFR BLD AUTO: 41.5 % (ref 35–47)
HGB BLD-MCNC: 13.5 G/DL (ref 11.5–16)
LYMPHOCYTES # BLD AUTO: 26 % (ref 12–49)
LYMPHOCYTES # BLD: 3.1 K/UL (ref 0.8–3.5)
MCH RBC QN AUTO: 27.7 PG (ref 26–34)
MCHC RBC AUTO-ENTMCNC: 32.5 G/DL (ref 30–36.5)
MCV RBC AUTO: 85 FL (ref 80–99)
MONOCYTES # BLD: 0.9 K/UL (ref 0–1)
MONOCYTES NFR BLD AUTO: 7 % (ref 5–13)
NEUTS SEG # BLD: 7.8 K/UL (ref 1.8–8)
NEUTS SEG NFR BLD AUTO: 66 % (ref 32–75)
PLATELET # BLD AUTO: 273 K/UL (ref 150–400)
RBC # BLD AUTO: 4.88 M/UL (ref 3.8–5.2)
WBC # BLD AUTO: 11.8 K/UL (ref 3.6–11)

## 2017-03-31 PROCEDURE — 99282 EMERGENCY DEPT VISIT SF MDM: CPT | Performed by: OBSTETRICS & GYNECOLOGY

## 2017-03-31 PROCEDURE — 75410000002 HC LABOR FEE PER 1 HR: Performed by: OBSTETRICS & GYNECOLOGY

## 2017-03-31 PROCEDURE — 74011000258 HC RX REV CODE- 258: Performed by: ADVANCED PRACTICE MIDWIFE

## 2017-03-31 PROCEDURE — 85025 COMPLETE CBC W/AUTO DIFF WBC: CPT | Performed by: ADVANCED PRACTICE MIDWIFE

## 2017-03-31 PROCEDURE — 99218 HC RM OBSERVATION: CPT

## 2017-03-31 PROCEDURE — 65270000029 HC RM PRIVATE

## 2017-03-31 PROCEDURE — 10907ZC DRAINAGE OF AMNIOTIC FLUID, THERAPEUTIC FROM PRODUCTS OF CONCEPTION, VIA NATURAL OR ARTIFICIAL OPENING: ICD-10-PCS | Performed by: OBSTETRICS & GYNECOLOGY

## 2017-03-31 PROCEDURE — 4A1HXCZ MONITORING OF PRODUCTS OF CONCEPTION, CARDIAC RATE, EXTERNAL APPROACH: ICD-10-PCS | Performed by: OBSTETRICS & GYNECOLOGY

## 2017-03-31 PROCEDURE — 36415 COLL VENOUS BLD VENIPUNCTURE: CPT | Performed by: ADVANCED PRACTICE MIDWIFE

## 2017-03-31 PROCEDURE — 74011250636 HC RX REV CODE- 250/636: Performed by: ADVANCED PRACTICE MIDWIFE

## 2017-03-31 RX ORDER — SODIUM CHLORIDE 0.9 % (FLUSH) 0.9 %
5-10 SYRINGE (ML) INJECTION EVERY 8 HOURS
Status: DISCONTINUED | OUTPATIENT
Start: 2017-03-31 | End: 2017-04-01 | Stop reason: HOSPADM

## 2017-03-31 RX ORDER — SODIUM CHLORIDE 0.9 % (FLUSH) 0.9 %
5-10 SYRINGE (ML) INJECTION AS NEEDED
Status: DISCONTINUED | OUTPATIENT
Start: 2017-03-31 | End: 2017-04-01 | Stop reason: HOSPADM

## 2017-03-31 RX ORDER — MAG HYDROX/ALUMINUM HYD/SIMETH 200-200-20
30 SUSPENSION, ORAL (FINAL DOSE FORM) ORAL
Status: DISCONTINUED | OUTPATIENT
Start: 2017-03-31 | End: 2017-04-01 | Stop reason: HOSPADM

## 2017-03-31 RX ORDER — LIDOCAINE HYDROCHLORIDE 10 MG/ML
10 INJECTION INFILTRATION; PERINEURAL AS NEEDED
Status: DISCONTINUED | OUTPATIENT
Start: 2017-03-31 | End: 2017-04-01 | Stop reason: HOSPADM

## 2017-03-31 RX ORDER — NALOXONE HYDROCHLORIDE 0.4 MG/ML
0.4 INJECTION, SOLUTION INTRAMUSCULAR; INTRAVENOUS; SUBCUTANEOUS AS NEEDED
Status: DISCONTINUED | OUTPATIENT
Start: 2017-03-31 | End: 2017-04-01 | Stop reason: HOSPADM

## 2017-03-31 RX ORDER — SODIUM CHLORIDE, SODIUM LACTATE, POTASSIUM CHLORIDE, CALCIUM CHLORIDE 600; 310; 30; 20 MG/100ML; MG/100ML; MG/100ML; MG/100ML
125 INJECTION, SOLUTION INTRAVENOUS CONTINUOUS
Status: DISCONTINUED | OUTPATIENT
Start: 2017-03-31 | End: 2017-04-01 | Stop reason: HOSPADM

## 2017-03-31 RX ORDER — OXYTOCIN IN 5 % DEXTROSE 30/500 ML
PLASTIC BAG, INJECTION (ML) INTRAVENOUS
Status: COMPLETED
Start: 2017-03-31 | End: 2017-04-01

## 2017-03-31 RX ADMIN — SODIUM CHLORIDE, SODIUM LACTATE, POTASSIUM CHLORIDE, AND CALCIUM CHLORIDE 1000 ML: 600; 310; 30; 20 INJECTION, SOLUTION INTRAVENOUS at 19:45

## 2017-03-31 RX ADMIN — PENICILLIN G POTASSIUM 2.5 MILLION UNITS: 20000000 POWDER, FOR SOLUTION INTRAVENOUS at 22:50

## 2017-03-31 RX ADMIN — SODIUM CHLORIDE, SODIUM LACTATE, POTASSIUM CHLORIDE, AND CALCIUM CHLORIDE 125 ML/HR: 600; 310; 30; 20 INJECTION, SOLUTION INTRAVENOUS at 19:20

## 2017-03-31 RX ADMIN — SODIUM CHLORIDE 5 MILLION UNITS: 900 INJECTION, SOLUTION INTRAVENOUS at 19:22

## 2017-03-31 NOTE — IP AVS SNAPSHOT
Summary of Care Report The Summary of Care report has been created to help improve care coordination. Users with access to [employer] or [address] (Web-based application) may access additional patient information including the Discharge Summary. If you are not currently a [address] user and need more information, please call the number listed below in the [place] [address] section and ask to be connected with Medical Records. Facility Information Name Address Phone [address] [facility] [address] [phone] Patient Information Patient Name Sex  Joauqina Schmitt ([id number]) Female 1990 Discharge Information Admitting Provider Service Area Unit Mckayla Brown, ELLIS / [phone] [facility] 3 Mother Infant / [phone] Discharge Provider Discharge Date/Time Discharge Disposition Destination (none) 4/3/2017 Morning (Pending) AHR (none) Patient Language Language ENGLISH [13] Hospital Problems as of 4/3/2017  Reviewed: 4/3/2017  9:00 AM by Geovanni Vang CNM None Non-Hospital Problems as of 4/3/2017  Reviewed: 4/3/2017  9:00 AM by Geovanni Vang CNM None You are allergic to the following No active allergies Current Discharge Medication List  
  
CONTINUE these medications which have NOT CHANGED Dose & Instructions Dispensing Information Comments PRENATAL DHA+COMPLETE PRENATAL -300 mg-mcg-mg Cmpk Generic drug:  PNV no.24-iron-folic acid-dha Take  by mouth. Refills:  0 Current Immunizations Name Date Tdap 3/10/2017 Follow-up Information Follow up With Details Comments Contact Info None   None (395) Patient stated that they have no PCP Discharge Instructions Medications: *Ibuprofen (over the counter) up to 600 mg every 6 hours as needed for pain or cramping *Continue Prenatal vitamins and DHA supplements while breastfeeding *You may use a stool softener if needed until comfortable with bowel movements, may take up to 100 mg of docusate sodium (Colace- over the counter) twice daily Appointments: *Follow-up with CNM in 2 weeks as directed Your Care Instructions Congratulations on the birth of your baby. Like pregnancy, the  period can be a time of excitement, katherine, and exhaustion. You may look at your wondrous little baby and feel happy. You may also be overwhelmed by your new sleep hours and new responsibilities. At first, babies often sleep during the days and are awake at night. They do not have a pattern or routine. They may make sudden gasps, jerk themselves awake, or look like they have crossed eyes. These are all normal, and they may even make you smile. In these first weeks after delivery, try to take good care of yourself. It may take 4 to 6 weeks to feel like yourself again, and possibly longer if you had a  birth. You will likely feel very tired for several weeks. Your days will be full of ups and downs, but lots of katherine as well. Follow-up care is a key part of your treatment and safety. Be sure to make and go to all appointments, and call your midwife if you are having problems. It's also a good idea to know your test results and keep a list of the medicines you take. How can you care for yourself at home? Take care of your body after delivery · Use pads instead of tampons for the bloody flow that may last as long as 2 weeks. · Ease cramps with ibuprofen (Advil). · Ease soreness of hemorrhoids and the area between your vagina and rectum with ice compresses or witch hazel pads. · Ease constipation by drinking lots of fluid and eating high-fiber foods. Ask your midwife about over-the-counter stool softeners. · Cleanse yourself with a gentle squeeze of warm water from a bottle instead of wiping with toilet paper. · Take a sitz bath in warm water several times a day. · Wear a good nursing bra. Ease sore and swollen breasts with warm, wet washcloths. · If you are not breast-feeding, use ice rather than heat for breast soreness. · Your period may not start for several months if you are breast-feeding. You may bleed more, and longer at first, than you did before you got pregnant. · Wait until you are healed (about 4 to 6 weeks) before you have sexual intercourse. · Try not to travel with your baby for 5 or 6 weeks. If you take a long car trip, make frequent stops to walk around and stretch. Avoid exhaustion · Rest every day. Try to nap when your baby naps. · Ask another adult to be with you for a few days after delivery. · Plan for  if you have other children. · Stay flexible so you can eat at odd hours and sleep when you need to. Both you and your baby are making new schedules. · Plan small trips to get out of the house. Change can make you feel less tired. · Ask for help with housework, cooking, and shopping. Remind yourself that your job is to care for your baby. Know about help for postpartum depression · \"Baby blues are common for the first 1 to 2 weeks after birth. You may cry or feel sad or irritable for no reason. · Rest whenever you can. Being tired makes it harder to handle your emotions. · Go for walks with your baby. · Talk to your partner, friends, and family about your feelings. · If your symptoms last for more than a few weeks, or if you feel very depressed, ask your doctor for help. · Postpartum depression can be treated. Support groups and counseling can help. Sometimes medicine can also help. Stay healthy · Eat healthy foods so you have more energy, make good breast milk, and lose extra baby pounds. · If you breast-feed, avoid alcohol and drugs. Stay smoke-free.  If you quit during pregnancy, congratulations. · Start daily exercise after 4 to 6 weeks, but rest when you feel tired. · Learn exercises to tone your belly. Do Kegel exercises to regain strength in your pelvic muscles. You can do these exercises while you stand or sit. ¨ Squeeze the same muscles you would use to stop your urine. Your belly and rear end (buttocks) should not move. ¨ Hold the squeeze for 3 seconds, then relax for 3 seconds. ¨ Repeat the exercise 10 to 15 times for each session. Do three or more sessions each day. · Find a class for new mothers and new babies that has an exercise time. · If you had a  birth, give yourself a bit more time before you exercise, and be careful. When should you call for help? Call 911 anytime you think you may need emergency care. For example, call if: 
· You have sudden, severe pain in your belly. · You passed out (lost consciousness). Call your provider now or seek immediate medical care if: 
· You have severe vaginal bleeding. You are passing blood clots and soaking through a pad each hour for 2 or more hours. · Your vaginal bleeding seems to be getting heavier or is still bright red 4 days after delivery, or you pass blood clots larger than the size of a golf ball. · You are dizzy or lightheaded, or you feel like you may faint. · You are vomiting or cannot keep fluids down. · You have a fever. · You have new or more belly pain. · You pass tissue (not just blood). · Your breast or breasts have hard, red, or tender areas. · You have an urgent or frequent need to urinate, along with a burning feeling. · You have severe pain, tenderness, or swelling in your vagina or the area between your rectum and vagina. · You have severe pains in your chest, belly, back, or legs. · You have feelings of severe despair or great anxiety. · Your baby is unusually cranky or is sleeping too much. · Your baby's eyes are red or have discharge. · Your baby has white patches on the roof and sides of the mouth or tongue. · Your baby's umbilical cord is foul-smelling, swollen, red, or leaking pus. · There is blood or mucus in your baby's bowel movements. · Your baby has fewer than 6 wet diapers a day. · Your baby does not want to eat, or your baby is throwing up with every feeding. · Your baby has trouble breathing. · Your baby has a rectal temperature of 100.4°F or more, or an underarm temperature over 99.4°F. 
· You baby has a low temperature less than 97.5°F rectal, or less than 97. 0°F underarm. · Your baby's skin looks yellow. Watch closely for changes in your health, and be sure to contact your doctor if you have any problems. Where can you learn more? Go to [employer] Enter A461 in the search box to learn more about \"After Your Delivery (the Postpartum Period): After Your Visit. \"  
© 9890-5832 Healthwise, Incorporated. Care instructions adapted under license by [facility] (which disclaims liability or warranty for this information). This care instruction is for use with your licensed healthcare professional. If you have questions about a medical condition or this instruction, always ask your healthcare professional. Juliann Keto any warranty or liability for your use of this information. Content Version: 8.8.46190; Last Revised: July 8, 2010 Depression After Childbirth: After Your Birth Many women get the \"baby blues\" during the first few days after childbirth. They may lose sleep, feel irritable, cry easily, and feel happy one minute and sad the next. Hormone changes are one cause of these emotional changes. Also, the demands of a new baby, coupled with visits from relatives or other family needs, add to a mother's stress. The \"baby blues\" usually peak around the fourth day and then ease up in less than 2 weeks.  
If your moodiness or anxiety lasts for more than 2 weeks, or if you feel like life is not worth living, you may have postpartum depression. This is different for each mother. Some mothers with serious depression may worry intensely about their infant's well-being, while others may feel distant from their child. Some mothers might even feel that they might harm their baby. A mother may have signs of paranoia, wondering if someone is watching her. Depression is not a sign of weakness. It is a medical condition that requires treatment. Medicine and counseling are often effective at reducing depression. Talk to your midwife about taking antidepressant medicine while breast-feeding. Follow-up care is a key part of your treatment and safety. Be sure to make and go to all appointments, and call your midwife if you are having problems. It's also a good idea to know your test results and keep a list of the medicines you take. How can you care for yourself at home? · Take your medicines exactly as prescribed. Call your provider if you think you are having a problem with your medicine. · Eat a balanced diet, so that you can keep up your energy. · Get regular daily exercise, such as walks, to help improve your mood. · Get as much sunlight as possible. Keep your shades and curtains open, and get outside as much as you can. · Avoid using alcohol or other substances to feel better. · Get as much rest and sleep as possible, and avoid doing too much. Being too tired can increase depression. · Play stimulating music throughout your day and soothing music at night. · Schedule outings and visits with friends and family. Ask them to call you regularly, so that you do not feel alone. · Ask for help with preparing food and other daily tasks. Family and friends are often happy to help a mother with a . · Be honest with yourself and those who care about you. Tell them about your struggle. · Join a support group of new mothers.  No one can better understand the challenges of caring for a  than other new mothers. When should you call for help? Call 911 anytime you think you may need emergency care. For example, call if: 
· You feel you cannot stop from hurting yourself or someone else. Call your provider now or seek immediate medical care if: 
· You are having trouble caring for yourself or your baby. · You have signs of paranoia that can occur with postpartum depression. You fear that someone is watching you, stealing from you, or reading your mind. · You hear voices. · You have symptoms of postpartum depression, such as: ¨ Sleeplessness. ¨ Anxiety. ¨ Hopelessness. ¨ Irritability. ¨ Poor concentration. · Someone you know has depression and: 
¨ Starts to give away his or her possessions. ¨ Uses illegal drugs or drinks alcohol heavily. ¨ Talks or writes about death, including writing suicide notes and talking about guns, knives, or pills. ¨ Starts to spend a lot of time alone. ¨ Acts very aggressively or suddenly appears calm. Watch closely for changes in your health, and be sure to contact your doctor if you have any problems. Where can you learn more? Go to i[employer].be Enter A970 in the search box to learn more about \"Depression After Childbirth: After Your Visit. \"  
© 7023-7863 Healthwise, Incorporated. Care instructions adapted under license by [facility] (which disclaims liability or warranty for this information). This care instruction is for use with your licensed healthcare professional. If you have questions about a medical condition or this instruction, always ask your healthcare professional. Rosalva Carrillo any warranty or liability for your use of this information. Content Version: 8.8.21496; Last Revised: 2009 After Pregnancy: Exercises Your Care Instructions Here are some examples of exercises that can help after pregnancy.  Start each exercise slowly. Ease off the exercise if you start to have pain. Your provider or physical therapist will tell you when you can start these exercises and which ones will work best for you. How to do the exercises Linda Plush · Do Kegel exercises to regain strength in your pelvic muscles. You can do these exercises while you stand or sit. Try and do them every time you feed the baby for the first six weeks. At first it will feels as though you can not hold the [facility] but your control/strength will increase over time in the postpartum period. This will help to reduce the incidence of pelvic discomfort and urinary or fecal incontinence. ¨ Squeeze the same muscles you would use to stop your urine. Your belly and rear end (buttocks) should not move. ¨ Squeeze for 10 seconds, then relax slowly for 5 seconds. ¨ Repeat the exercise 10 times for each session. Do three or more sessions each day. Tummy tuck 1. Lie on your back, and place two fingers just inside your hip bones so you can feel your lower belly muscles. 2. Take a deep breath in. 
3. As you breathe out, pull your belly button in toward your spine, as if you are trying to zip up a tight pair of jeans. You should feel your lower belly muscles pull slightly away from your fingers as the muscles tighten. 4. Hold for about 6 seconds, but do not hold your breath. 5. Repeat 8 to 12 times. 6. Repeat several times a day, and try to hold your lower belly muscles in for longer as you get stronger. 7. Practice doing this exercise while you are standing, such as when you are standing in line, or sitting. Heel slides 1. Lie on the floor with your knees bent, and place two fingers just inside your hip bones so you can feel your lower belly muscles. Your feet should be flat on the floor. 2. Pull your belly button in toward your spine. You should feel your lower belly muscles pull slightly away from your fingers as the muscles tighten. 3. Keep holding your belly button in as you slowly slide one foot along the floor until your leg is out straight. 4. Slowly slide the leg back to your starting position while making sure that you keep holding your belly button in. 
5. Do not arch or move your back as you are doing this. Do not hold your breath. 6. Relax and repeat with your other leg. 7. Repeat 8 to 12 times. Knee-to-chest stretch 1. Lie on your back with one knee bent and the other leg straight. 2. Clasp your hands together under your bent knee and bring the knee to your chest. Keep your lower back pressed to the floor. 3. If it hurts your back to keep your opposite leg straight as you stretch, bend that knee too, and keep that foot flat on the floor. 4. Hold for at least 15 to 30 seconds. 5. Relax and lower the knee to the starting position. 6. Repeat with the other leg. 7. Repeat 2 to 4 times with each leg. Neck rotation 1. Sit in a firm chair, or stand up straight. 2. Keeping your chin level, turn your head to the right, and hold for 15 to 30 seconds. 3. Turn your head to the left and hold for 15 to 30 seconds. 4. Repeat 2 to 4 times to each side. Shoulder rolls 1. Sit comfortably with your feet shoulder-width apart. You can also do this exercise while standing. 2. Roll your shoulders up, then back, and then down in a smooth, circular motion. 3. Repeat 2 to 4 times. Midback stretch Note: If you have knee pain, do not do this exercise. 1. Kneel on the floor, and sit back on your ankles. 2. Lean forward, place your hands on the floor, and stretch your arms out in front of you. Rest your head between your arms. 3. Gently push your chest toward the floor, reaching as far in front of you as possible. 4. Hold for 15 to 30 seconds. 5. Repeat 2 to 4 times. Back stretches 1. Get down on your hands and knees on the floor. 2. Relax your head and allow it to droop. Round your back up toward the ceiling until you feel a nice stretch in your upper, middle, and lower back. Hold this stretch for as long as it feels comfortable, or about 15 to 30 seconds. 3. Return to the starting position with a flat back while you are on your hands and knees. 4. Let your back sway by pressing your stomach toward the floor. Lift your buttocks toward the ceiling. 5. Hold this position for 15 to 30 seconds. 6. Repeat 2 to 4 times. Hamstring stretch (lying down) 1. Lie flat on your back with your legs straight. If you feel discomfort in your back, place a small towel roll under your lower back. 2. Holding the back of your leg, lift your leg straight up and toward your body until you feel a stretch at the back of your thigh. 3. Hold the stretch for at least 30 seconds. 4. Repeat 2 to 4 times. 5. Switch legs and repeat steps 1 through 4. Calf stretch 1. Stand facing a wall with your hands on the wall at about eye level. Put your leg about a step behind your other leg. 2. Keeping your back leg straight and your back heel on the floor, bend your front knee and gently bring your hip and chest toward the wall until you feel a stretch in the calf of your back leg. 3. Hold the stretch for 15 to 30 seconds. 4. Repeat 2 to 4 times. 5. Repeat steps 1 through 4, but this time keep your back knee bent. 6. Switch legs and repeat steps 1 through 5. Follow-up care is a key part of your treatment and safety. Be sure to make and go to all appointments, and call your doctor if you are having problems. It's also a good idea to know your test results and keep a list of the medicines you take. Where can you learn more? Go to Imnish.be Darleene Marking in the search box to learn more about \"After Pregnancy: Exercises. \"  
© 2291-6728 Healthwise, Incorporated.  Care instructions adapted under license by [employer] (which disclaims liability or warranty for this information). This care instruction is for use with your licensed healthcare professional. If you have questions about a medical condition or this instruction, always ask your healthcare professional. [facility] any warranty or liability for your use of this information. Content Version: 8.8.64174; Last Revised: February 12, 2010 Chart Review Routing History No Routing History on File

## 2017-03-31 NOTE — IP AVS SNAPSHOT
Katy Barrios 
 
 
 [address] [place], [place] [address] 
[phone] Patient: Citlaly Govea MRN: [id number] UVU:[id number] You are allergic to the following No active allergies Recent Documentation Height Weight Breastfeeding? BMI OB Status Smoking Status 1.549 m 75.8 kg Unknown 31.55 kg/m2 Recent pregnancy Never Smoker Unresulted Labs Order Current Status SAMPLE TO BLOOD BANK In process Emergency Contacts Name Discharge Info Relation Home Work Mobile Dada Herrera  Boyfrkeven [17] [phone] About your hospitalization You were admitted on:  March 31, 2017 You last received care in the:  [facility] You were discharged on:  April 3, 2017 Unit phone number:  [phone] Why you were hospitalized Your primary diagnosis was:  Not on File Your diagnoses also included:  Labor And Delivery Indication For Care Or Intervention Providers Seen During Your Hospitalizations Provider Role Specialty Primary office phone Elisa Yung MD Attending Provider Obstetrics & Gynecology [phone] Your Primary Care Physician (PCP) Primary Care Physician Office Phone Office Fax NONE ** None ** ** None ** Follow-up Information Follow up With Details Comments Contact Info None   None ([phone]) Patient stated that they have no PCP Your Appointments Monday April 03, 2017  3:00 PM EDT PROCEDURE with Elisa Yung MD  
[address]) [address] [place] [address] [address]. [phone] [facility]  
[phone] Monday April 03, 2017  3:30 PM EDT  
OB VISIT with Tobi Dozier CNM [address] ([address]) [address] [place] [address] Road. [phone] [facility]  
[phone] Current Discharge Medication List  
  
 CONTINUE these medications which have NOT CHANGED Dose & Instructions Dispensing Information Comments Morning Noon Evening Bedtime PRENATAL DHA+COMPLETE PRENATAL -300 mg-mcg-mg Cmpk Generic drug:  PNV no.24-iron-folic acid-dha Your last dose was: Your next dose is: Take  by mouth. Refills:  0 Discharge Instructions Medications:  
*Ibuprofen (over the counter) up to 600 mg every 6 hours as needed for pain or cramping *Continue Prenatal vitamins and DHA supplements while breastfeeding *You may use a stool softener if needed until comfortable with bowel movements, may take up to 100 mg of docusate sodium (Colace- over the counter) twice daily Appointments: *Follow-up with CNM in 2 weeks as directed Your Care Instructions Congratulations on the birth of your baby. Like pregnancy, the  period can be a time of excitement, katherine, and exhaustion. You may look at your wondrous little baby and feel happy. You may also be overwhelmed by your new sleep hours and new responsibilities. At first, babies often sleep during the days and are awake at night. They do not have a pattern or routine. They may make sudden gasps, jerk themselves awake, or look like they have crossed eyes. These are all normal, and they may even make you smile. In these first weeks after delivery, try to take good care of yourself. It may take 4 to 6 weeks to feel like yourself again, and possibly longer if you had a  birth. You will likely feel very tired for several weeks. Your days will be full of ups and downs, but lots of katherine as well. Follow-up care is a key part of your treatment and safety. Be sure to make and go to all appointments, and call your midwife if you are having problems. It's also a good idea to know your test results and keep a list of the medicines you take. How can you care for yourself at home? Take care of your body after delivery · Use pads instead of tampons for the bloody flow that may last as long as 2 weeks. · Ease cramps with ibuprofen (Advil). · Ease soreness of hemorrhoids and the area between your vagina and rectum with ice compresses or witch hazel pads. · Ease constipation by drinking lots of fluid and eating high-fiber foods. Ask your midwife about over-the-counter stool softeners. · Cleanse yourself with a gentle squeeze of warm water from a bottle instead of wiping with toilet paper. · Take a sitz bath in warm water several times a day. · Wear a good nursing bra. Ease sore and swollen breasts with warm, wet washcloths. · If you are not breast-feeding, use ice rather than heat for breast soreness. · Your period may not start for several months if you are breast-feeding. You may bleed more, and longer at first, than you did before you got pregnant. · Wait until you are healed (about 4 to 6 weeks) before you have sexual intercourse. · Try not to travel with your baby for 5 or 6 weeks. If you take a long car trip, make frequent stops to walk around and stretch. Avoid exhaustion · Rest every day. Try to nap when your baby naps. · Ask another adult to be with you for a few days after delivery. · Plan for  if you have other children. · Stay flexible so you can eat at odd hours and sleep when you need to. Both you and your baby are making new schedules. · Plan small trips to get out of the house. Change can make you feel less tired. · Ask for help with housework, cooking, and shopping. Remind yourself that your job is to care for your baby. Know about help for postpartum depression · \"Baby blues are common for the first 1 to 2 weeks after birth. You may cry or feel sad or irritable for no reason. · Rest whenever you can. Being tired makes it harder to handle your emotions. · Go for walks with your baby. · Talk to your partner, friends, and family about your feelings. · If your symptoms last for more than a few weeks, or if you feel very depressed, ask your doctor for help. · Postpartum depression can be treated. Support groups and counseling can help. Sometimes medicine can also help. Stay healthy · Eat healthy foods so you have more energy, make good breast milk, and lose extra baby pounds. · If you breast-feed, avoid alcohol and drugs. Stay smoke-free. If you quit during pregnancy, congratulations. · Start daily exercise after 4 to 6 weeks, but rest when you feel tired. · Learn exercises to tone your belly. Do Kegel exercises to regain strength in your pelvic muscles. You can do these exercises while you stand or sit. ¨ Squeeze the same muscles you would use to stop your urine. Your belly and rear end (buttocks) should not move. ¨ Hold the squeeze for 3 seconds, then relax for 3 seconds. ¨ Repeat the exercise 10 to 15 times for each session. Do three or more sessions each day. · Find a class for new mothers and new babies that has an exercise time. · If you had a  birth, give yourself a bit more time before you exercise, and be careful. When should you call for help? Call 911 anytime you think you may need emergency care. For example, call if: 
· You have sudden, severe pain in your belly. · You passed out (lost consciousness). Call your provider now or seek immediate medical care if: 
· You have severe vaginal bleeding. You are passing blood clots and soaking through a pad each hour for 2 or more hours. · Your vaginal bleeding seems to be getting heavier or is still bright red 4 days after delivery, or you pass blood clots larger than the size of a golf ball. · You are dizzy or lightheaded, or you feel like you may faint. · You are vomiting or cannot keep fluids down. · You have a fever. · You have new or more belly pain. · You pass tissue (not just blood). · Your breast or breasts have hard, red, or tender areas. · You have an urgent or frequent need to urinate, along with a burning feeling. · You have severe pain, tenderness, or swelling in your vagina or the area between your rectum and vagina. · You have severe pains in your chest, belly, back, or legs. · You have feelings of severe despair or great anxiety. · Your baby is unusually cranky or is sleeping too much. · Your baby's eyes are red or have discharge. · Your baby has white patches on the roof and sides of the mouth or tongue. · Your baby's umbilical cord is foul-smelling, swollen, red, or leaking pus. · There is blood or mucus in your baby's bowel movements. · Your baby has fewer than 6 wet diapers a day. · Your baby does not want to eat, or your baby is throwing up with every feeding. · Your baby has trouble breathing. · Your baby has a rectal temperature of 100.4°F or more, or an underarm temperature over 99.4°F. 
· You baby has a low temperature less than 97.5°F rectal, or less than 97. 0°F underarm. · Your baby's skin looks yellow. Watch closely for changes in your health, and be sure to contact your doctor if you have any problems. Where can you learn more? Go to GoodyTag.be Enter A461 in the search box to learn more about \"After Your Delivery (the Postpartum Period): After Your Visit. \"  
© 1049-3717 Healthwise, Incorporated. Care instructions adapted under license by Tanis Epley (which disclaims liability or warranty for this information). This care instruction is for use with your licensed healthcare professional. If you have questions about a medical condition or this instruction, always ask your healthcare professional. Kathi Salaam any warranty or liability for your use of this information. Content Version: 8.8.36506; Last Revised: July 8, 2010 Depression After Childbirth: After Your Birth Many women get the \"baby blues\" during the first few days after childbirth. They may lose sleep, feel irritable, cry easily, and feel happy one minute and sad the next. Hormone changes are one cause of these emotional changes. Also, the demands of a new baby, coupled with visits from relatives or other family needs, add to a mother's stress. The \"baby blues\" usually peak around the fourth day and then ease up in less than 2 weeks. If your moodiness or anxiety lasts for more than 2 weeks, or if you feel like life is not worth living, you may have postpartum depression. This is different for each mother. Some mothers with serious depression may worry intensely about their infant's well-being, while others may feel distant from their child. Some mothers might even feel that they might harm their baby. A mother may have signs of paranoia, wondering if someone is watching her. Depression is not a sign of weakness. It is a medical condition that requires treatment. Medicine and counseling are often effective at reducing depression. Talk to your midwife about taking antidepressant medicine while breast-feeding. Follow-up care is a key part of your treatment and safety. Be sure to make and go to all appointments, and call your midwife if you are having problems. It's also a good idea to know your test results and keep a list of the medicines you take. How can you care for yourself at home? · Take your medicines exactly as prescribed. Call your provider if you think you are having a problem with your medicine. · Eat a balanced diet, so that you can keep up your energy. · Get regular daily exercise, such as walks, to help improve your mood. · Get as much sunlight as possible. Keep your shades and curtains open, and get outside as much as you can. · Avoid using alcohol or other substances to feel better. · Get as much rest and sleep as possible, and avoid doing too much. Being too tired can increase depression. · Play stimulating music throughout your day and soothing music at night. · Schedule outings and visits with friends and family. Ask them to call you regularly, so that you do not feel alone. · Ask for help with preparing food and other daily tasks. Family and friends are often happy to help a mother with a . · Be honest with yourself and those who care about you. Tell them about your struggle. · Join a support group of new mothers. No one can better understand the challenges of caring for a  than other new mothers. When should you call for help? Call 911 anytime you think you may need emergency care. For example, call if: 
· You feel you cannot stop from hurting yourself or someone else. Call your provider now or seek immediate medical care if: 
· You are having trouble caring for yourself or your baby. · You have signs of paranoia that can occur with postpartum depression. You fear that someone is watching you, stealing from you, or reading your mind. · You hear voices. · You have symptoms of postpartum depression, such as: ¨ Sleeplessness. ¨ Anxiety. ¨ Hopelessness. ¨ Irritability. ¨ Poor concentration. · Someone you know has depression and: 
¨ Starts to give away his or her possessions. ¨ Uses illegal drugs or drinks alcohol heavily. ¨ Talks or writes about death, including writing suicide notes and talking about guns, knives, or pills. ¨ Starts to spend a lot of time alone. ¨ Acts very aggressively or suddenly appears calm. Watch closely for changes in your health, and be sure to contact your doctor if you have any problems. Where can you learn more? Go to Daleeli.be Enter B838 in the search box to learn more about \"Depression After Childbirth: After Your Visit. \"  
© 1193-2090 Healthwise, Incorporated.  Care instructions adapted under license by [employer] (which disclaims liability or warranty for this information). This care instruction is for use with your licensed healthcare professional. If you have questions about a medical condition or this instruction, always ask your healthcare professional. Kymberly Smith any warranty or liability for your use of this information. Content Version: 8.8.10620; Last Revised: April 27, 2009 After Pregnancy: Exercises Your Care Instructions Here are some examples of exercises that can help after pregnancy. Start each exercise slowly. Ease off the exercise if you start to have pain. Your provider or physical therapist will tell you when you can start these exercises and which ones will work best for you. How to do the exercises Hope Mireille · Do Kegel exercises to regain strength in your pelvic muscles. You can do these exercises while you stand or sit. Try and do them every time you feed the baby for the first six weeks. At first it will feels as though you can not hold the [facility] but your control/strength will increase over time in the postpartum period. This will help to reduce the incidence of pelvic discomfort and urinary or fecal incontinence. ¨ Squeeze the same muscles you would use to stop your urine. Your belly and rear end (buttocks) should not move. ¨ Squeeze for 10 seconds, then relax slowly for 5 seconds. ¨ Repeat the exercise 10 times for each session. Do three or more sessions each day. Tummy tuck 1. Lie on your back, and place two fingers just inside your hip bones so you can feel your lower belly muscles. 2. Take a deep breath in. 
3. As you breathe out, pull your belly button in toward your spine, as if you are trying to zip up a tight pair of jeans. You should feel your lower belly muscles pull slightly away from your fingers as the muscles tighten. 4. Hold for about 6 seconds, but do not hold your breath. 5. Repeat 8 to 12 times.  
6. Repeat several times a day, and try to hold your lower belly muscles in for longer as you get stronger. 7. Practice doing this exercise while you are standing, such as when you are standing in line, or sitting. Heel slides 1. Lie on the floor with your knees bent, and place two fingers just inside your hip bones so you can feel your lower belly muscles. Your feet should be flat on the floor. 2. Pull your belly button in toward your spine. You should feel your lower belly muscles pull slightly away from your fingers as the muscles tighten. 3. Keep holding your belly button in as you slowly slide one foot along the floor until your leg is out straight. 4. Slowly slide the leg back to your starting position while making sure that you keep holding your belly button in. 
5. Do not arch or move your back as you are doing this. Do not hold your breath. 6. Relax and repeat with your other leg. 7. Repeat 8 to 12 times. Knee-to-chest stretch 1. Lie on your back with one knee bent and the other leg straight. 2. Clasp your hands together under your bent knee and bring the knee to your chest. Keep your lower back pressed to the floor. 3. If it hurts your back to keep your opposite leg straight as you stretch, bend that knee too, and keep that foot flat on the floor. 4. Hold for at least 15 to 30 seconds. 5. Relax and lower the knee to the starting position. 6. Repeat with the other leg. 7. Repeat 2 to 4 times with each leg. Neck rotation 1. Sit in a firm chair, or stand up straight. 2. Keeping your chin level, turn your head to the right, and hold for 15 to 30 seconds. 3. Turn your head to the left and hold for 15 to 30 seconds. 4. Repeat 2 to 4 times to each side. Shoulder rolls 1. Sit comfortably with your feet shoulder-width apart. You can also do this exercise while standing. 2. Roll your shoulders up, then back, and then down in a smooth, circular motion. 3. Repeat 2 to 4 times. Midback stretch Note: If you have knee pain, do not do this exercise. 1. Kneel on the floor, and sit back on your ankles. 2. Lean forward, place your hands on the floor, and stretch your arms out in front of you. Rest your head between your arms. 3. Gently push your chest toward the floor, reaching as far in front of you as possible. 4. Hold for 15 to 30 seconds. 5. Repeat 2 to 4 times. Back stretches 1. Get down on your hands and knees on the floor. 2. Relax your head and allow it to droop. Round your back up toward the ceiling until you feel a nice stretch in your upper, middle, and lower back. Hold this stretch for as long as it feels comfortable, or about 15 to 30 seconds. 3. Return to the starting position with a flat back while you are on your hands and knees. 4. Let your back sway by pressing your stomach toward the floor. Lift your buttocks toward the ceiling. 5. Hold this position for 15 to 30 seconds. 6. Repeat 2 to 4 times. Hamstring stretch (lying down) 1. Lie flat on your back with your legs straight. If you feel discomfort in your back, place a small towel roll under your lower back. 2. Holding the back of your leg, lift your leg straight up and toward your body until you feel a stretch at the back of your thigh. 3. Hold the stretch for at least 30 seconds. 4. Repeat 2 to 4 times. 5. Switch legs and repeat steps 1 through 4. Calf stretch 1. Stand facing a wall with your hands on the wall at about eye level. Put your leg about a step behind your other leg. 2. Keeping your back leg straight and your back heel on the floor, bend your front knee and gently bring your hip and chest toward the wall until you feel a stretch in the calf of your back leg. 3. Hold the stretch for 15 to 30 seconds. 4. Repeat 2 to 4 times. 5. Repeat steps 1 through 4, but this time keep your back knee bent. 6. Switch legs and repeat steps 1 through 5. Follow-up care is a key part of your treatment and safety. Be sure to make and go to all appointments, and call your doctor if you are having problems. It's also a good idea to know your test results and keep a list of the medicines you take. Where can you learn more? Go to DealExplorer.be Karena Jeter in the search box to learn more about \"After Pregnancy: Exercises. \"  
© 8970-5706 Healthwise, Incorporated. Care instructions adapted under license by Gabriella Ladd (which disclaims liability or warranty for this information). This care instruction is for use with your licensed healthcare professional. If you have questions about a medical condition or this instruction, always ask your healthcare professional. Verla Ramesh any warranty or liability for your use of this information. Content Version: 8.8.63837; Last Revised: February 12, 2010 Discharge Orders None Rewarding Return Announcement We are excited to announce that we are making your provider's discharge notes available to you in Rewarding Return. You will see these notes when they are completed and signed by the physician that discharged you from your recent hospital stay. If you have any questions or concerns about any information you see in Rewarding Return, please call the Health Information Department where you were seen or reach out to your Primary Care Provider for more information about your plan of care. Introducing [facility]! Gabriella Ladd introduces Rewarding Return patient portal. Now you can access parts of your medical record, email your doctor's office, and request medication refills online. 1. In your internet browser, go to [email or web address] 2. Click on the First Time User? Click Here link in the Sign In box. You will see the New Member Sign Up page. 3. Enter your Rewarding Return Access Code exactly as it appears below.  You will not need to use this code after youve completed the sign-up process. If you do not sign up before the expiration date, you must request a new code. · MyFitnessPal Access Code: [email or web address] Expires: 5/15/2017  2:33 PM 
 
4. Enter the last four digits of your Social Security Number (xxxx) and Date of Birth (mm/dd/yyyy) as indicated and click Submit. You will be taken to the next sign-up page. 5. Create a MyFitnessPal ID. This will be your MyFitnessPal login ID and cannot be changed, so think of one that is secure and easy to remember. 6. Create a MyFitnessPal password. You can change your password at any time. 7. Enter your Password Reset Question and Answer. This can be used at a later time if you forget your password. 8. Enter your e-mail address. You will receive e-mail notification when new information is available in [address] 9. Click Sign Up. You can now view and download portions of your medical record. 10. Click the Download Summary menu link to download a portable copy of your medical information. If you have questions, please visit the Frequently Asked Questions section of the MyFitnessPal website. Remember, MyFitnessPal is NOT to be used for urgent needs. For medical emergencies, dial 911. Now available from your iPhone and Android! General Information Please provide this summary of care documentation to your next provider. Patient Signature:  ____________________________________________________________ Date:  ____________________________________________________________  
  
Mike Eloy Provider Signature:  ____________________________________________________________ Date:  ____________________________________________________________

## 2017-03-31 NOTE — PROGRESS NOTES
Chief Complaint   Patient presents with    Routine Prenatal Visit     40w5d     Visit Vitals    /84    Pulse 89    Temp 98.2 °F (36.8 °C) (Oral)    Ht 5' 1\" (1.549 m)    Wt 167 lb (75.8 kg)    BMI 31.55 kg/m2     1. Have you been to the ER, urgent care clinic since your last visit? Hospitalized since your last visit? No    2. Have you seen or consulted any other health care providers outside of the [address] [place] [address] since your last visit? Include any pap smears or colon screening.  No

## 2017-03-31 NOTE — H&P
History & Physical    Name: Ana Luisa Rasmussen MRN: [id number]  SSN: [id number]    YOB: 1990  Age: 32 y.o. Sex: female        Subjective: Branden Santiago is a 31 YO  at 40+5 weeks gestation who started sheyla today at 1500 and became q3-4 minutes at 1700. She reported good fetal movement amd denied loss of fluid or vaginal bleeding. She did report losing her mucous plug. On admission to Labor and Delivery she was 5/0/-1 and 100% with a bulging bag. Estimated Date of Delivery: 3/26/17  OB History    Para Term  AB SAB TAB Ectopic Multiple Living   1               # Outcome Date GA Lbr Brodie/2nd Weight Sex Delivery Anes PTL Lv   1 Current                   Ms. Lauren Saldivar is admitted with pregnancy at 40w5d for active labor. Prenatal course was normal. Please see prenatal records for details. Past Medical History:   Diagnosis Date    Abnormal Papanicolaou smear of cervix      No past surgical history on file. Social History     Occupational History    Not on file. Social History Main Topics    Smoking status: Never Smoker    Smokeless tobacco: Not on file    Alcohol use No    Drug use: No    Sexual activity: Yes     Partners: Male     Birth control/ protection: None     Family History   Problem Relation Age of Onset    Asthma Brother        No Known Allergies  Prior to Admission medications    Medication Sig Start Date End Date Taking? Authorizing Provider   PNV no.24-iron-folic acid-dha (PRENATAL DHA+COMPLETE PRENATAL) [id number] mg-mcg-mg cmpk Take  by mouth. Historical Provider        Review of Systems: Constitutional: negative  Eyes: negative  Ears, nose, mouth, throat, and face: negative  Respiratory: negative  Cardiovascular: negative  Gastrointestinal: negative  Genitourinary:negative  Integument/breast: negative  Musculoskeletal:negative  Neurological: negative  Behavioral/Psych: negative  Allergic/Immunologic: negative    Objective:     Vitals:   There were no vitals filed for this visit. Physical Exam:  Patient with distress. Thyroid- normal without nodules  Lungs- CTA  Cardiac- RRR  Abdomen- Soft,NT  Uterus- Gravid, NT, EFW 6.5 lbs  Ext- no edema reflexes brisk +2  Cervical Exam- 5/0-1/100 vertex    Membranes:  Intact  Fetal Heart Rate: Reactive  Baseline: 160 per minute  Variability: moderate  Accelerations: yes  Uterine contractions: irregular, every 3 minutes  Fetal tachycardia noted    Prenatal Labs:   Lab Results   Component Value Date/Time    Rubella, External IMMUNE 09/16/2016    GrBStrep, External POSITIVE 03/05/2017    HBsAg, External NEGATIVE 09/16/2016    HIV, External NON-REACTIVE 09/16/2016    RPR, External NON-REACTIVE 09/16/2016    Gonorrhea, External NEGATIVE 09/16/2016    Chlamydia, External NEGATIVE 09/16/2016    ABO,Rh O POSITIVE 09/16/2016         Assessment/Plan:     Active Problems:    Labor and delivery indication for care or intervention (3/31/2017)         Plan: Admit for Reassuring fetal status, Labor  Progressing normally, Continue plan for vaginal delivery. Group B Strep was positive, will treat prophylactically with penicillin. Will continue with EFM and give fluid bolus for fetal tachycardia. Continue close observation of fetal status.      Signed By:  Jessica De Anda CNM     March 31, 2017

## 2017-03-31 NOTE — PROGRESS NOTES
S: Feeling well. No significant complaints or concerns. Reports consistent, daily fetal mvmt. Has had a few ctxs, this morning, denies LOF, or vaginal bldng. Declines cervical exam today. Options for post dates reviewed and would like to wait for labor as long as possible. Has BPP scheduled for Monday, at 41+1 weeks gestation. Aware would need IOL at 41+5 weeks or transfer to MD service. O: See prenatal flowsheet for maternal and fetal exam  Blood pressure 127/84, pulse 89, temperature 98.2 °F (36.8 °C), temperature source Oral, height 5' 1\" (1.549 m), weight 167 lb (75.8 kg), last menstrual period 2016. A:  40w5d   Size=Dates    P: declines schedule of IOL today, would like to await onset of labor or wait until results of BPP on Monday. FKCs stressed.   See prenatal checklist for other topics covered during today's visit  RTO in 3 days for EDDIE with CNM/MD for BPP

## 2017-03-31 NOTE — IP AVS SNAPSHOT
Current Discharge Medication List  
  
CONTINUE these medications which have NOT CHANGED Dose & Instructions Dispensing Information Comments Morning Noon Evening Bedtime PRENATAL DHA+COMPLETE PRENATAL -300 mg-mcg-mg Cmpk Generic drug:  PNV no.24-iron-folic acid-dha Your last dose was: Your next dose is: Take  by mouth. Refills:  0

## 2017-04-01 PROCEDURE — 77030018749 HC HK AMNIO DISP DERY -A

## 2017-04-01 PROCEDURE — 74011250637 HC RX REV CODE- 250/637: Performed by: ADVANCED PRACTICE MIDWIFE

## 2017-04-01 PROCEDURE — 65270000029 HC RM PRIVATE

## 2017-04-01 PROCEDURE — 75410000002 HC LABOR FEE PER 1 HR: Performed by: OBSTETRICS & GYNECOLOGY

## 2017-04-01 PROCEDURE — 75410000000 HC DELIVERY VAGINAL/SINGLE: Performed by: OBSTETRICS & GYNECOLOGY

## 2017-04-01 PROCEDURE — 74011250636 HC RX REV CODE- 250/636

## 2017-04-01 PROCEDURE — 74011250637 HC RX REV CODE- 250/637: Performed by: OBSTETRICS & GYNECOLOGY

## 2017-04-01 PROCEDURE — 76210000000 HC OR PH I REC 2 TO 2.5 HR

## 2017-04-01 PROCEDURE — 77010026065 HC OXYGEN MINIMUM MEDICAL AIR: Performed by: OBSTETRICS & GYNECOLOGY

## 2017-04-01 RX ORDER — HYDROCORTISONE ACETATE PRAMOXINE HCL 2.5; 1 G/100G; G/100G
CREAM TOPICAL AS NEEDED
Status: DISCONTINUED | OUTPATIENT
Start: 2017-04-01 | End: 2017-04-03 | Stop reason: HOSPADM

## 2017-04-01 RX ORDER — PEPPERMINT OIL
SPIRIT ORAL ONCE
Status: DISCONTINUED | OUTPATIENT
Start: 2017-04-01 | End: 2017-04-01

## 2017-04-01 RX ORDER — ACETAMINOPHEN 325 MG/1
650 TABLET ORAL
Status: DISCONTINUED | OUTPATIENT
Start: 2017-04-01 | End: 2017-04-03 | Stop reason: HOSPADM

## 2017-04-01 RX ORDER — HYDROCODONE BITARTRATE AND ACETAMINOPHEN 5; 325 MG/1; MG/1
1 TABLET ORAL
Status: DISCONTINUED | OUTPATIENT
Start: 2017-04-01 | End: 2017-04-03 | Stop reason: HOSPADM

## 2017-04-01 RX ORDER — SIMETHICONE 80 MG
80 TABLET,CHEWABLE ORAL
Status: DISCONTINUED | OUTPATIENT
Start: 2017-04-01 | End: 2017-04-03 | Stop reason: HOSPADM

## 2017-04-01 RX ORDER — DOCUSATE SODIUM 100 MG/1
100 CAPSULE, LIQUID FILLED ORAL
Status: DISCONTINUED | OUTPATIENT
Start: 2017-04-01 | End: 2017-04-03 | Stop reason: HOSPADM

## 2017-04-01 RX ORDER — SWAB
1 SWAB, NON-MEDICATED MISCELLANEOUS DAILY
Status: DISCONTINUED | OUTPATIENT
Start: 2017-04-01 | End: 2017-04-03 | Stop reason: HOSPADM

## 2017-04-01 RX ORDER — OXYTOCIN IN 5 % DEXTROSE 30/500 ML
500 PLASTIC BAG, INJECTION (ML) INTRAVENOUS
Status: DISCONTINUED | OUTPATIENT
Start: 2017-04-01 | End: 2017-04-03 | Stop reason: HOSPADM

## 2017-04-01 RX ORDER — IBUPROFEN 800 MG/1
800 TABLET ORAL EVERY 8 HOURS
Status: DISCONTINUED | OUTPATIENT
Start: 2017-04-01 | End: 2017-04-03 | Stop reason: HOSPADM

## 2017-04-01 RX ADMIN — Medication 999 ML/HR: at 00:50

## 2017-04-01 RX ADMIN — MUPIROCIN: 20 OINTMENT TOPICAL at 09:26

## 2017-04-01 RX ADMIN — IBUPROFEN 800 MG: 800 TABLET, FILM COATED ORAL at 17:47

## 2017-04-01 RX ADMIN — IBUPROFEN 800 MG: 800 TABLET, FILM COATED ORAL at 09:26

## 2017-04-01 RX ADMIN — DOCUSATE SODIUM 100 MG: 100 CAPSULE ORAL at 22:32

## 2017-04-01 RX ADMIN — Medication 1 TABLET: at 09:26

## 2017-04-01 RX ADMIN — IBUPROFEN 800 MG: 800 TABLET, FILM COATED ORAL at 01:41

## 2017-04-01 NOTE — L&D DELIVERY NOTE
CNM Delivery Note       Patient: Geoffrey Hartman MRN: [id number]  SSN: [id number]    YOB: 1990  Age: 32 y.o. Sex: female        Patient was - and was LOT position. She was positioned in knee chest position on floor and  rebozo sifting was s done for 3 contractions. Elvie Worthy immediately had strong urge to push and was c/c/+1 at 2337. Pushing initiated in knee chest position and   of a live baby girl at [facility] 3701 with Apgars 9,9 in JOVANA position under no anesthesia or epidural anesthesia or IV pain medications with mother in high  Beckman's  position. Shoulders spontaneous, easily delivered with maternal effort. Vigorous infant placed on maternal abdomen immediately following delivery. After cord pulsation ceased, cord clamped x 2 and cut by LUIS Prater. Placenta and membranes spontaneous, intact, with 3 vessel cord via Joeline Overcast mechanism at [employer]. . Fundus massaged to firm. Estimated blood loss 100 mL. Vagina and perineum inspected. Perineum intact. Small skid adilia with bleeding noted repaired with 1 figure of eight of 3-0 vicryl with mother using nitrous oxide. Mother and infant stable, bonding, establishing breastfeeding. Delivery Summary    Patient: Geoffrey Hartman MRN: [id number]  SSN: [id number]    YOB: 1990  Age: 32 y.o. Sex: female        Labor Events:    Labor: No    Rupture Date: 3/31/2017    Rupture Time: 11:37 PM    Rupture Type AROM    Amniotic Fluid Volume:       Amniotic Fluid Description: Clear; Other (comment)       Induction: None         Augmentation: AROM    Labor Complications: Additional Complications:        Cervical Ripening:       None      Delivery Events:  Episiotomy: None    Laceration(s):  Other (comment)  perineal skidmark, periurethral skidmark    Repaired: Yes     Number of Repair Packets:      Suture Type and Size: Vicryl 3-0        Estimated Blood Loss (ml): 100        Information for the patient's :  Orvis Brisk, Female [[id number]]     Delivery Summary - Baby    Delivery Date: 2017   Delivery Time: 12:15 AM   Delivery Type: Vaginal, Spontaneous Delivery  Sex:  female  Gestational Age: 38w9d  Delivery Clinician:  Christopher Tijerinalor  Living?: Yes   Delivery Location: L&D 202           APGARS  One minute Five minutes Ten minutes   Skin Color: 1    1       Heart Rate: 2   2         Reflex Irritability: 2   2         Muscle Tone: 2   2       Respiration: 2   2         Total: 9   9           Presentation: Vertex  Position: Left Occiput Anterior  Resuscitation Method:  Suctioning-bulb; Tactile Stimulation     Meconium Stained: Other (Comment)    Cord Information: 3 Vessels   Complications: None  Cord Blood Sent?:  Yes    Blood Gases Sent?:  No    Placenta:  Date/Time:  12:23 AM  Removal: Spontaneous      Appearance: Normal;Intact      Measurements:  Birth Weight:      Birth Length:     Head Circumference:       Chest Circumference:      Abdominal Girth: Other Providers:   ;ROCAEL Dyer;Debbie PATEL ASHLEE L Obstetrician;Primary Nurse;Primary  Nurse;Charge Nurse;Staff Nurse; Anesthesiologist;Crna;Nurse Practitioner;Midwife;Nursery Nurse           Cord Blood Results:  Information for the patient's :  Rey Rehman, Female [[id number]]   No results found for: Charis Lyle, PCTDIG, BILI, ABORHEXT, [address]    Information for the patient's :  Rey Rehman, Female [[id number]]   No results found for: APH, APCO2, APO2, AHCO3, ABEC, ABDC, O2ST, SITE, [place], PHI, [place], PO2I, HCO3I, SO2I, IBD     Information for the patient's newbornEdelmira Solid, Female [[id number]]   No results found for: EPHV, PCO2V, PO2V, HCO3V, O2STV, EBDV

## 2017-04-01 NOTE — PROGRESS NOTES
SBAR IN Report: Mother    Verbal report received from Dipika Valentino RN (full name & credentials) on this patient, who is now being transferred from  and D (unit) for routine progression of care. The patient is not wearing a green \"Anesthesia-Duramorph\" band. Report consisted of patient's Situation, Background, Assessment and Recommendations (SBAR). [place] ID bands were compared with the identification form, and verified with the patient and transferring nurse. Information from the SBAR, Kardex, Intake/Output and MAR and the Stephany Report was reviewed with the transferring nurse; opportunity for questions and clarification provided.

## 2017-04-01 NOTE — PROGRESS NOTES
0700- Bedside and Verbal shift change report given to Shanelle (oncoming nurse) by Ha Zaman (offgoing nurse). Report included the following information SBAR.   0730- Pt resting bed with eyes closed. VSS. No complaints at this time. 1515- TRANSFER - OUT REPORT:    Verbal report given to Josi Kelley (name) on Teresa Mcmullen  being transferred to MIU (unit) for routine progression of care       Report consisted of patients Situation, Background, Assessment and   Recommendations(SBAR). Information from the following report(s) SBAR was reviewed with the receiving nurse. Lines:       Opportunity for questions and clarification was provided.       Patient transported with:   Registered Nurse

## 2017-04-01 NOTE — LACTATION NOTE
Discussed with mother her plan for feeding. Reviewed the benefits of exclusive breast milk feeding during the hospital stay. Informed her of the risks of using formula to supplement in the first few days of life as well as the benefits of successful breast milk feeding; referred her to the Breastfeeding booklet about this information. She acknowledges understanding of information reviewed and states that it is her plan to breastfeed her infant. Will support her choice and offer additional information as needed. Biological Nurturing breastfeeding principles taught. How Biological Nurturing (BN)  promotes optimal breastfeeding (BF) sessions discussed. Mother encouraged to seek comfortable semi-reclining breastfeeding positions. Infant placed frontally along maternal contour. Primitive innate feeding reflexes/behaviors of the  discussed. BN tips and techniques shared; assisted with comfortable breastfeeding positioning. Reviewed breastfeeding basics:  How milk is made and normal  breastfeeding behaviors discussed. Supply and demand,  stomach size, early feeding cues, skin to skin bonding with comfortable positioning and baby led latch-on reviewed. How to identify signs of successful breastfeeding sessions reviewed; education on assymetrical latch, signs of effective latching vs shallow, in-effective latching, normal  feeding frequency and duration and expected infant output discussed. Normal course of breastfeeding discussed including the AAP's recommendation that children receive exclusive breast milk feedings for the first six months of life with breast milk feedings to continue through the first year of life and/or beyond as complimentary table foods are added. Breastfeeding Booklet and Warm line information provided with discussion.   Discussed typical  weight loss and the importance of pediatrician appointment within 24-48 hours of discharge, at 2 weeks of life and normalcy of requesting pediatric weight checks as needed in between visits. Pt will successfully establish breastfeeding by feeding in response to early feeding cues   or wake every 3h, will obtain deep latch, and will keep log of feedings/output. Taught to BF at hunger cues and or q 2-3 hrs and to offer 10-20 drops of hand expressed colostrum at any non-feeds. Breast Assessment  Left Breast: Large  Left Nipple: Intact, Everted, Short  Right Breast: Large  Right Nipple:  Intact, Everted, Short  Breast- Feeding Assessment  Attends Breast-Feeding Classes: No  Breast-Feeding Experience: No  Breast Trauma/Surgery: No  Type/Quality: Good  Lactation Consultant Visits  Breast-Feedings: Good   Mother/Infant Observation  Mother Observation: Alignment, Recognizes feeding cues, Lets baby end feeding, Nipple round on release, Breast comfortable  Infant Observation: Relaxed after feeding, Rhythmic suck, Feeding cues, Frenulum checked, Latches nipple and aereolae, Lips flanged, lower, Lips flanged, upper, Opens mouth  LATCH Documentation  Latch: Repeated attempts, hold nipple in mouth, stimulate to suck  Audible Swallowing: A few with stimulation  Type of Nipple: Everted (after stimulation)  Comfort (Breast/Nipple): Filling, red/small blisters/bruises, mild/mod discomfort  Hold (Positioning): Full assist, teach one side, mother does other, staff holds  [facility] Score: 6

## 2017-04-01 NOTE — PROGRESS NOTES
[facility] Labor Progress Note     Patient: Anson Turner MRN: [id number]  SSN: [id number]    YOB: 1990  Age: 32 y.o. Sex: female        Subjective:   Patient coping well with contractions sitting up in bed, with good support from [facility] Cynthia, [facility], using nitrous. .       Objective:   Blood pressure 123/72, pulse 86, temperature 98.8 °F (37.1 °C), resp. rate 18, last menstrual period 2016, SpO2 99 %. Fetal heart [id number], moderate variability, positive accelerations, negative decelerations, Uterine contractions q 2 minutes, strong to palpation, resting tone soft, Sterile Vaginal Exam: 9 cm dilated/100 % effaced/ -1 station, cervix anterior, fetal presentation vertex, membranes intact with bulging bag. Assessment:     40w5d  Category 2 fetal heart rate tracing due to periods of tachycardia, overall reassuring.    Active Labor  Progressing well       Plan:   Continue current orders/management   CNM management   Anticipate     Coihector De Anda CNM

## 2017-04-01 NOTE — ROUTINE PROCESS
Bedside and Verbal shift change report given to Anjali Valdes rn (oncoming nurse) by Bobby Bettencourt RN (offgoing nurse). Report included the following information SBAR, Kardex, Intake/Output, MAR and Recent Results.

## 2017-04-01 NOTE — PROGRESS NOTES
Bedside shift change report given to Tasia Trinidad RN (oncoming nurse) by Marck Kaur RN (offgoing nurse). Report included the following information SBAR, Kardex, Intake/Output and MAR.

## 2017-04-02 PROCEDURE — 74011250637 HC RX REV CODE- 250/637: Performed by: ADVANCED PRACTICE MIDWIFE

## 2017-04-02 PROCEDURE — 65270000029 HC RM PRIVATE

## 2017-04-02 RX ADMIN — IBUPROFEN 800 MG: 800 TABLET, FILM COATED ORAL at 15:37

## 2017-04-02 RX ADMIN — IBUPROFEN 800 MG: 800 TABLET, FILM COATED ORAL at 23:50

## 2017-04-02 RX ADMIN — IBUPROFEN 800 MG: 800 TABLET, FILM COATED ORAL at 05:10

## 2017-04-02 NOTE — PROGRESS NOTES
CNMPostpartumNoteDay1  S: Patient ambulating and voiding without difficulty. Patient happy with birth experience, thrilled she was able to go natural like she wanted. Breastfeeding well. No complaints. O: Vital signs stable, Heart RRR without murmur, Lungs CTA bilaterally, FF @ umbilicus, lochia rubra light, breasts soft, nipples intact, no edema. A: Postpartum Day 1  Breastfeeding well.    Blood type O pos/Rubella immune/GBS positive     P: Continue current postpartum orders  Lactation consult   Anticipate discharge tomorrow  Got TDAP during pregnancy

## 2017-04-02 NOTE — ROUTINE PROCESS
Written shift change report given to Francy Vallejo RN (oncoming nurse) by Camille Yañez RN (offgoing nurse). Report included the following information SBAR, Kardex, Intake/Output, MAR and Accordion.

## 2017-04-02 NOTE — PROGRESS NOTES
Bedside and Verbal shift change report given to Elton Centeno (oncoming nurse) by Verena Saavedra RN  (offgoing nurse). Report included the following information SBAR, Kardex, Intake/Output, MAR and Recent Results.

## 2017-04-02 NOTE — LACTATION NOTE
Mother resting in bed attempting to BF baby in football position. Baby very drowsy at breast.  Assisted mother with positioning in side-lying position and latching. Baby latched deeply, mother able to tell difference between a shallow versus deep latch. Audible swallows and rhythmic sucking noted. BF basics reviewed. Reviewed breastfeeding techniques and positions with mother until found a position she was most comfortable with. Reminded mother of early feeding cues and that breast fed infants should be fed on demand without time restriction on the first breast until the infant seems satisfied. Then the second breast is offered. Advised mother to awaken  to feed if three hours have passed since baby last ate. Will continue to monitor mother's progress with breastfeeding and offer assistance at any time. Reviewed breastfeeding basics:  How milk is made and normal  breastfeeding behaviors discussed. Supply and demand,  stomach size, early feeding cues, skin to skin bonding with comfortable positioning and baby led latch-on reviewed. How to identify signs of successful breastfeeding sessions reviewed; education on assymetrical latch, signs of effective latching vs shallow, in-effective latching, normal  feeding frequency and duration and expected infant output discussed. Pt will successfully establish breastfeeding by feeding in response to early feeding cues   or wake every 3h, will obtain deep latch, and will keep log of feedings/output. Taught to BF at hunger cues and or q 2-3 hrs and to offer 10-20 drops of hand expressed colostrum at any non-feeds.       Breast Assessment  Left Breast: Large  Left Nipple: Everted, Intact, Short  Right Breast: Large  Right Nipple: Everted, Intact, Short  Breast- Feeding Assessment  Attends Breast-Feeding Classes: No  Breast-Feeding Experience: No  Breast Trauma/Surgery: No  Type/Quality: Good  Lactation Consultant Visits  Breast-Feedings: Good   Mother/Infant Observation  Mother Observation: Breast comfortable, Recognizes feeding cues, Nipple round on release, Lets baby end feeding, Holds breast  Infant Observation: Rhythmic suck, Feeding cues, Lips flanged, upper, Lips flanged, lower, Relaxed after feeding, Opens mouth  LATCH Documentation  Latch: Grasps breast, tongue down, lips flanged, rhythmic sucking  Audible Swallowing: Spontaneous and intermittent (24 hours old)  Type of Nipple: Everted (after stimulation)  Comfort (Breast/Nipple): Soft/non-tender  Hold (Positioning): Full assist, teach one side, mother does other, staff holds  [facility] Score: 9

## 2017-04-03 VITALS
TEMPERATURE: 98.1 F | BODY MASS INDEX: 31.53 KG/M2 | OXYGEN SATURATION: 100 % | DIASTOLIC BLOOD PRESSURE: 52 MMHG | SYSTOLIC BLOOD PRESSURE: 101 MMHG | RESPIRATION RATE: 16 BRPM | HEIGHT: 61 IN | WEIGHT: 167 LBS | HEART RATE: 75 BPM

## 2017-04-03 PROBLEM — Z34.00 NORMAL FIRST PREGNANCY CONFIRMED, ANTEPARTUM: Status: RESOLVED | Noted: 2017-01-16 | Resolved: 2017-04-03

## 2017-04-03 PROCEDURE — 74011250637 HC RX REV CODE- 250/637: Performed by: ADVANCED PRACTICE MIDWIFE

## 2017-04-03 PROCEDURE — 74011250637 HC RX REV CODE- 250/637: Performed by: OBSTETRICS & GYNECOLOGY

## 2017-04-03 RX ADMIN — Medication 1 TABLET: at 07:52

## 2017-04-03 RX ADMIN — DOCUSATE SODIUM 100 MG: 100 CAPSULE ORAL at 07:52

## 2017-04-03 RX ADMIN — IBUPROFEN 800 MG: 800 TABLET, FILM COATED ORAL at 07:52

## 2017-04-03 NOTE — PROGRESS NOTES
CNMPostpartumNoteDay2- Discharge     S: Patient ambulating and voiding without difficulty. Breastfeeding well. No complaints. Ready to go home.       O: Vital signs stable, Heart RRR without murmur, Lungs CTA bilaterally, FF below umbilicus, lochia rubra light or moderate, breasts soft, nipples intact, mild edema in right fingers and hand     A: Postpartum Day 2- ,   Breastfeeding  Stable     P: Follow routine postpartum discharge instructions  Discharge home with baby  Ibuprofen OTC up to 600 mg every 6 hours as needed for pain or cramping  Continue PNV while breastfeeding  Continue stool softner until comfortable with bowel movements  Follow-up with CNM in 2 weeks as directed

## 2017-04-03 NOTE — LACTATION NOTE
Biological Nurturing breastfeeding principles taught. How Biological Nurturing (BN)  promotes optimal breastfeeding (BF) sessions discussed. Mother encouraged to seek comfortable semi-reclining breastfeeding positions. Infant placed frontally along maternal contour. Primitive innate feeding reflexes/behaviors of the  discussed. BN tips and techniques shared; assisted with comfortable breastfeeding positioning. Pt will successfully establish breastfeeding by feeding in response to early feeding cues   or wake every 3h, will obtain deep latch, and will keep log of feedings/output. Taught to BF at hunger cues and or q 2-3 hrs and to offer 10-20 drops of hand expressed colostrum at any non-feeds. Breast Assessment  Left Breast: Large  Left Nipple: Everted, Intact, Tender, Short  Right Breast: Large  Right Nipple: Everted, Short, Intact, Tender  Breast- Feeding Assessment  Attends Breast-Feeding Classes: No  Breast-Feeding Experience: No  Breast Trauma/Surgery: No  Type/Quality: Good  Lactation Consultant Visits  Breast-Feedings: Not breast-feeding  Mother/Infant Observation  Mother Observation: Alignment, Lets baby end feeding, Breast comfortable, Nipple round on release, Recognizes feeding cues, Sleepy after feeding  Infant Observation: Latches nipple and aereolae, Rhythmic suck, Relaxed after feeding, Opens mouth, Lips flanged, upper, Lips flanged, lower, Audible swallows  LATCH Documentation  Latch: Grasps breast, tongue down, lips flanged, rhythmic sucking  Audible Swallowing: Spontaneous and intermittent (24 hours old)  Type of Nipple: Everted (after stimulation)  Comfort (Breast/Nipple): Filling, red/small blisters/bruises, mild/mod discomfort  Hold (Positioning): No assist from staff, mother able to position/hold infant  LATCH Score: 9  Chart shows numerous feedings, void, stool WNL. Discussed importance of monitoring outputs and feedings on first week of life.   Discussed ways to tell if baby is  getting enough breast milk, ie  voids and stools, change in color of stool, and return to birth wt within 2 weeks. Follow up with pediatrician visit for weight check in 1-2 days (per AAP guidelines.)  Encouraged to call Warm Line  [phone] or [facility] at [phone] for any questions/problems that arise. Mother also given breastfeeding support group dates and times for any future needsAnticipatory guidance given. Questions answered. Discussed signs of baby's allergy, excema. Discussed engorgement management, when breast are soft and flat you are making more milk than when hard and engorged. If you should have to take a medication and MD says can't breast feed contact lactation office. Breast feed if you or the baby gets sick to pass along natural antibiotics. Mom's nipples are a little tender.   Gave her gel pads and showed her how to use them

## 2017-04-03 NOTE — ROUTINE PROCESS
Patient off unit in stable condition via wheelchair with volunteers for discharge home per  MD.  Patient is to follow up in 2 week and in 6 weeks and is aware. Patient denies H/A, dizziness, nausea and or vomiting or pain at this time. Infant in car seat with mom.

## 2017-04-03 NOTE — DISCHARGE SUMMARY
Obstetrical Discharge Summary     Name: Teresa Mcmullen MRN: [id number]  SSN: [id number]    YOB: 1990  Age: 32 y.o. Sex: female      Admit Date: 3/31/2017    Discharge Date: 4/3/2017     Admitting Physician: Anabell Wright CNM     Attending Physician:  Tim Rosenthal MD     * Admission Diagnoses: POSSIBLE LABOR  Labor and delivery indication for care or intervention    * Discharge Diagnoses:   Information for the patient's :  Kayla Daly, Female [[id number]]   Delivery of a 7 lb 1.9 oz (3.23 kg) female infant via Vaginal, Spontaneous Delivery on 2017 at 12:15 AM  by . Apgars were 9 and 9. Additional Diagnoses:   Hospital Problems as of 4/3/2017  Date Reviewed: 4/3/2017          Codes Class Noted - Resolved POA    RESOLVED: Labor and delivery indication for care or intervention ICD-10-CM: O75.9  ICD-9-CM: 659.90  3/31/2017 - 4/3/2017 Unknown             Lab Results   Component Value Date/Time    Rubella, External IMMUNE 2016    GrBStrep, External POSITIVE 2017    ABO,Rh O POSITIVE 2016      Immunization History   Administered Date(s) Administered    Tdap 03/10/2017       * Procedures:  of LFI             * Discharge Condition: good    * Hospital Course: Normal hospital course following the delivery. * Disposition: Home    Discharge Medications:   Current Discharge Medication List      CONTINUE these medications which have NOT CHANGED    Details   PNV no.24-iron-folic acid-dha (PRENATAL DHA+COMPLETE PRENATAL) -300 mg-mcg-mg cmpk Take  by mouth. * Follow-up Care/Patient Instructions:   Activity: Activity as tolerated, No sex for 6 weeks and No heavy lifting for 6 weeks  Diet: Regular Diet  Wound Care: Keep wound clean and dry    Follow up with [facility] in 2 weeks and at 6 weeks postpartum     Signed By:  Lulu Ramírez CNM     April 3, 2017

## 2017-04-03 NOTE — DISCHARGE INSTRUCTIONS
Medications:   *Ibuprofen (over the counter) up to 600 mg every 6 hours as needed for pain or cramping  *Continue Prenatal vitamins and DHA supplements while breastfeeding  *You may use a stool softener if needed until comfortable with bowel movements, may take up to 100 mg of docusate sodium (Colace- over the counter) twice daily    Appointments:   *Follow-up with CNM in 2 weeks as directed    Your Care Instructions  Congratulations on the birth of your baby. Like pregnancy, the  period can be a time of excitement, katherine, and exhaustion. You may look at your wondrous little baby and feel happy. You may also be overwhelmed by your new sleep hours and new responsibilities. At first, babies often sleep during the days and are awake at night. They do not have a pattern or routine. They may make sudden gasps, jerk themselves awake, or look like they have crossed eyes. These are all normal, and they may even make you smile. In these first weeks after delivery, try to take good care of yourself. It may take 4 to 6 weeks to feel like yourself again, and possibly longer if you had a  birth. You will likely feel very tired for several weeks. Your days will be full of ups and downs, but lots of katherine as well. Follow-up care is a key part of your treatment and safety. Be sure to make and go to all appointments, and call your midwife if you are having problems. It's also a good idea to know your test results and keep a list of the medicines you take. How can you care for yourself at home? Take care of your body after delivery  · Use pads instead of tampons for the bloody flow that may last as long as 2 weeks. · Ease cramps with ibuprofen (Advil). · Ease soreness of hemorrhoids and the area between your vagina and rectum with ice compresses or witch hazel pads. · Ease constipation by drinking lots of fluid and eating high-fiber foods. Ask your midwife about over-the-counter stool softeners.   · Cleanse yourself with a gentle squeeze of warm water from a bottle instead of wiping with toilet paper. · Take a sitz bath in warm water several times a day. · Wear a good nursing bra. Ease sore and swollen breasts with warm, wet washcloths. · If you are not breast-feeding, use ice rather than heat for breast soreness. · Your period may not start for several months if you are breast-feeding. You may bleed more, and longer at first, than you did before you got pregnant. · Wait until you are healed (about 4 to 6 weeks) before you have sexual intercourse. · Try not to travel with your baby for 5 or 6 weeks. If you take a long car trip, make frequent stops to walk around and stretch. Avoid exhaustion  · Rest every day. Try to nap when your baby naps. · Ask another adult to be with you for a few days after delivery. · Plan for  if you have other children. · Stay flexible so you can eat at odd hours and sleep when you need to. Both you and your baby are making new schedules. · Plan small trips to get out of the house. Change can make you feel less tired. · Ask for help with housework, cooking, and shopping. Remind yourself that your job is to care for your baby. Know about help for postpartum depression  · \"Baby blues are common for the first 1 to 2 weeks after birth. You may cry or feel sad or irritable for no reason. · Rest whenever you can. Being tired makes it harder to handle your emotions. · Go for walks with your baby. · Talk to your partner, friends, and family about your feelings. · If your symptoms last for more than a few weeks, or if you feel very depressed, ask your doctor for help. · Postpartum depression can be treated. Support groups and counseling can help. Sometimes medicine can also help. Stay healthy  · Eat healthy foods so you have more energy, make good breast milk, and lose extra baby pounds. · If you breast-feed, avoid alcohol and drugs. Stay smoke-free.  If you quit during pregnancy, congratulations. · Start daily exercise after 4 to 6 weeks, but rest when you feel tired. · Learn exercises to tone your belly. Do Kegel exercises to regain strength in your pelvic muscles. You can do these exercises while you stand or sit. ¨ Squeeze the same muscles you would use to stop your urine. Your belly and rear end (buttocks) should not move. ¨ Hold the squeeze for 3 seconds, then relax for 3 seconds. ¨ Repeat the exercise 10 to 15 times for each session. Do three or more sessions each day. · Find a class for new mothers and new babies that has an exercise time. · If you had a  birth, give yourself a bit more time before you exercise, and be careful. When should you call for help? Call 911 anytime you think you may need emergency care. For example, call if:  · You have sudden, severe pain in your belly. · You passed out (lost consciousness). Call your provider now or seek immediate medical care if:  · You have severe vaginal bleeding. You are passing blood clots and soaking through a pad each hour for 2 or more hours. · Your vaginal bleeding seems to be getting heavier or is still bright red 4 days after delivery, or you pass blood clots larger than the size of a golf ball. · You are dizzy or lightheaded, or you feel like you may faint. · You are vomiting or cannot keep fluids down. · You have a fever. · You have new or more belly pain. · You pass tissue (not just blood). · Your breast or breasts have hard, red, or tender areas. · You have an urgent or frequent need to urinate, along with a burning feeling. · You have severe pain, tenderness, or swelling in your vagina or the area between your rectum and vagina. · You have severe pains in your chest, belly, back, or legs. · You have feelings of severe despair or great anxiety. · Your baby is unusually cranky or is sleeping too much. · Your baby's eyes are red or have discharge.   · Your baby has white patches on the roof and sides of the mouth or tongue. · Your baby's umbilical cord is foul-smelling, swollen, red, or leaking pus. · There is blood or mucus in your baby's bowel movements. · Your baby has fewer than 6 wet diapers a day. · Your baby does not want to eat, or your baby is throwing up with every feeding. · Your baby has trouble breathing. · Your baby has a rectal temperature of 100.4°F or more, or an underarm temperature over 99.4°F.  · You baby has a low temperature less than 97.5°F rectal, or less than 97. 0°F underarm. · Your baby's skin looks yellow. Watch closely for changes in your health, and be sure to contact your doctor if you have any problems. Where can you learn more? Go to "[employer].be  Enter A461 in the search box to learn more about \"After Your Delivery (the Postpartum Period): After Your Visit. \"   © 4170-6979 Healthwise, [employer]. Care instructions adapted under license by Iman Garnica (which disclaims liability or warranty for this information). This care instruction is for use with your licensed healthcare professional. If you have questions about a medical condition or this instruction, always ask your healthcare professional. Nancy Roof any warranty or liability for your use of this information. Content Version: 8.8.96699; Last Revised: July 8, 2010      Depression After Childbirth: After Your Birth  Many women get the \"baby blues\" during the first few days after childbirth. They may lose sleep, feel irritable, cry easily, and feel happy one minute and sad the next. Hormone changes are one cause of these emotional changes. Also, the demands of a new baby, coupled with visits from relatives or other family needs, add to a mother's stress. The \"baby blues\" usually peak around the fourth day and then ease up in less than 2 weeks.   If your moodiness or anxiety lasts for more than 2 weeks, or if you feel like life is not worth living, you may have postpartum depression. This is different for each mother. Some mothers with serious depression may worry intensely about their infant's well-being, while others may feel distant from their child. Some mothers might even feel that they might harm their baby. A mother may have signs of paranoia, wondering if someone is watching her. Depression is not a sign of weakness. It is a medical condition that requires treatment. Medicine and counseling are often effective at reducing depression. Talk to your midwife about taking antidepressant medicine while breast-feeding. Follow-up care is a key part of your treatment and safety. Be sure to make and go to all appointments, and call your midwife if you are having problems. It's also a good idea to know your test results and keep a list of the medicines you take. How can you care for yourself at home? · Take your medicines exactly as prescribed. Call your provider if you think you are having a problem with your medicine. · Eat a balanced diet, so that you can keep up your energy. · Get regular daily exercise, such as walks, to help improve your mood. · Get as much sunlight as possible. Keep your shades and curtains open, and get outside as much as you can. · Avoid using alcohol or other substances to feel better. · Get as much rest and sleep as possible, and avoid doing too much. Being too tired can increase depression. · Play stimulating music throughout your day and soothing music at night. · Schedule outings and visits with friends and family. Ask them to call you regularly, so that you do not feel alone. · Ask for help with preparing food and other daily tasks. Family and friends are often happy to help a mother with a . · Be honest with yourself and those who care about you. Tell them about your struggle. · Join a support group of new mothers. No one can better understand the challenges of caring for a  than other new mothers.   When should you call for help? Call 911 anytime you think you may need emergency care. For example, call if:  · You feel you cannot stop from hurting yourself or someone else. Call your provider now or seek immediate medical care if:  · You are having trouble caring for yourself or your baby. · You have signs of paranoia that can occur with postpartum depression. You fear that someone is watching you, stealing from you, or reading your mind. · You hear voices. · You have symptoms of postpartum depression, such as:  ¨ Sleeplessness. ¨ Anxiety. ¨ Hopelessness. ¨ Irritability. ¨ Poor concentration. · Someone you know has depression and:  ¨ Starts to give away his or her possessions. ¨ Uses illegal drugs or drinks alcohol heavily. ¨ Talks or writes about death, including writing suicide notes and talking about guns, knives, or pills. ¨ Starts to spend a lot of time alone. ¨ Acts very aggressively or suddenly appears calm. Watch closely for changes in your health, and be sure to contact your doctor if you have any problems. Where can you learn more? Go to [employer]be  Enter H591 in the search box to learn more about \"Depression After Childbirth: After Your Visit. \"   © 3808-6260 Healthwise, Incorporated. Care instructions adapted under license by Mumtaz Quiñones (which disclaims liability or warranty for this information). This care instruction is for use with your licensed healthcare professional. If you have questions about a medical condition or this instruction, always ask your healthcare professional. Genevieve Torres any warranty or liability for your use of this information. Content Version: 8.8.07535; Last Revised: April 27, 2009      After Pregnancy: Exercises  Your Care Instructions  Here are some examples of exercises that can help after pregnancy. Start each exercise slowly. Ease off the exercise if you start to have pain.   Your provider or physical therapist will tell you when you can start these exercises and which ones will work best for you. How to do the exercises  Kegals   · Do Kegel exercises to regain strength in your pelvic muscles. You can do these exercises while you stand or sit. Try and do them every time you feed the baby for the first six weeks. At first it will feels as though you can not hold the [facility] but your control/strength will increase over time in the postpartum period. This will help to reduce the incidence of pelvic discomfort and urinary or fecal incontinence. ¨ Squeeze the same muscles you would use to stop your urine. Your belly and rear end (buttocks) should not move. ¨ Squeeze for 10 seconds, then relax slowly for 5 seconds. ¨ Repeat the exercise 10 times for each session. Do three or more sessions each day. Tummy tuck          1. Lie on your back, and place two fingers just inside your hip bones so you can feel your lower belly muscles. 2. Take a deep breath in.  3. As you breathe out, pull your belly button in toward your spine, as if you are trying to zip up a tight pair of jeans. You should feel your lower belly muscles pull slightly away from your fingers as the muscles tighten. 4. Hold for about 6 seconds, but do not hold your breath. 5. Repeat 8 to 12 times. 6. Repeat several times a day, and try to hold your lower belly muscles in for longer as you get stronger. 7. Practice doing this exercise while you are standing, such as when you are standing in line, or sitting. Heel slides          1. Lie on the floor with your knees bent, and place two fingers just inside your hip bones so you can feel your lower belly muscles. Your feet should be flat on the floor. 2. Pull your belly button in toward your spine. You should feel your lower belly muscles pull slightly away from your fingers as the muscles tighten. 3. Keep holding your belly button in as you slowly slide one foot along the floor until your leg is out straight.   4. Slowly slide the leg back to your starting position while making sure that you keep holding your belly button in.  5. Do not arch or move your back as you are doing this. Do not hold your breath. 6. Relax and repeat with your other leg. 7. Repeat 8 to 12 times. Knee-to-chest stretch          1. Lie on your back with one knee bent and the other leg straight. 2. Clasp your hands together under your bent knee and bring the knee to your chest. Keep your lower back pressed to the floor. 3. If it hurts your back to keep your opposite leg straight as you stretch, bend that knee too, and keep that foot flat on the floor. 4. Hold for at least 15 to 30 seconds. 5. Relax and lower the knee to the starting position. 6. Repeat with the other leg. 7. Repeat 2 to 4 times with each leg. Neck rotation          1. Sit in a firm chair, or stand up straight. 2. Keeping your chin level, turn your head to the right, and hold for 15 to 30 seconds. 3. Turn your head to the left and hold for 15 to 30 seconds. 4. Repeat 2 to 4 times to each side. Shoulder rolls          1. Sit comfortably with your feet shoulder-width apart. You can also do this exercise while standing. 2. Roll your shoulders up, then back, and then down in a smooth, circular motion. 3. Repeat 2 to 4 times. Midback stretch          Note: If you have knee pain, do not do this exercise. 1. Kneel on the floor, and sit back on your ankles. 2. Lean forward, place your hands on the floor, and stretch your arms out in front of you. Rest your head between your arms. 3. Gently push your chest toward the floor, reaching as far in front of you as possible. 4. Hold for 15 to 30 seconds. 5. Repeat 2 to 4 times. Back stretches          1. Get down on your hands and knees on the floor. 2. Relax your head and allow it to droop. Round your back up toward the ceiling until you feel a nice stretch in your upper, middle, and lower back.  Hold this stretch for as long as it feels comfortable, or about 15 to 30 seconds. 3. Return to the starting position with a flat back while you are on your hands and knees. 4. Let your back sway by pressing your stomach toward the floor. Lift your buttocks toward the ceiling. 5. Hold this position for 15 to 30 seconds. 6. Repeat 2 to 4 times. Hamstring stretch (lying down)          1. Lie flat on your back with your legs straight. If you feel discomfort in your back, place a small towel roll under your lower back. 2. Holding the back of your leg, lift your leg straight up and toward your body until you feel a stretch at the back of your thigh. 3. Hold the stretch for at least 30 seconds. 4. Repeat 2 to 4 times. 5. Switch legs and repeat steps 1 through 4. Calf stretch          1. Stand facing a wall with your hands on the wall at about eye level. Put your leg about a step behind your other leg. 2. Keeping your back leg straight and your back heel on the floor, bend your front knee and gently bring your hip and chest toward the wall until you feel a stretch in the calf of your back leg. 3. Hold the stretch for 15 to 30 seconds. 4. Repeat 2 to 4 times. 5. Repeat steps 1 through 4, but this time keep your back knee bent. 6. Switch legs and repeat steps 1 through 5. Follow-up care is a key part of your treatment and safety. Be sure to make and go to all appointments, and call your doctor if you are having problems. It's also a good idea to know your test results and keep a list of the medicines you take. Where can you learn more? Go to [employer].be  Enter X152 in the search box to learn more about \"After Pregnancy: Exercises. \"   © 1527-2977 Healthwise, Incorporated. Care instructions adapted under license by [facility] (which disclaims liability or warranty for this information).  This care instruction is for use with your licensed healthcare professional. If you have questions about a medical condition or this instruction, always ask your healthcare professional. Salina Star any warranty or liability for your use of this information. Content Version: 8.8.55792;  Last Revised: February 12, 2010

## 2017-04-11 ENCOUNTER — OFFICE VISIT (OUTPATIENT)
Dept: MIDWIFE SERVICES | Age: 27
End: 2017-04-11

## 2017-04-11 VITALS
BODY MASS INDEX: 27.43 KG/M2 | DIASTOLIC BLOOD PRESSURE: 70 MMHG | WEIGHT: 145.31 LBS | HEART RATE: 100 BPM | SYSTOLIC BLOOD PRESSURE: 105 MMHG | HEIGHT: 61 IN

## 2017-04-11 RX ORDER — DOCUSATE SODIUM 100 MG/1
100 CAPSULE, LIQUID FILLED ORAL 2 TIMES DAILY
COMMUNITY

## 2017-04-11 NOTE — PROGRESS NOTES
Chief Complaint   Patient presents with   [address]     2wk PP     Visit Vitals    /70    Pulse 100    Ht 5' 1\" (1.549 m)    Wt 145 lb 5 oz (65.9 kg)    Breastfeeding Yes    BMI 27.46 kg/m2     1. Have you been to the ER, urgent care clinic since your last visit? Hospitalized since your last visit? No    2. Have you seen or consulted any other health care providers outside of the [address] [place] [address] since your last visit? Include any pap smears or colon screening.  No

## 2017-04-11 NOTE — MR AVS SNAPSHOT
Visit Information Date & Time Provider Department Dept. Phone Encounter #  
 4/11/2017  9:00 AM Loulou OrRonny young [id number] Upcoming Health Maintenance Date Due  
 HPV AGE 9Y-34Y (1 of 3 - Female 3 Dose Series) 4/12/2001 PAP AKA CERVICAL CYTOLOGY 9/22/2019 Allergies as of 4/11/2017  Review Complete On: 4/11/2017 By: Loulou Brenner CNM No Known Allergies Current Immunizations  Reviewed on 4/2/2017 Name Date Tdap 3/10/2017 Not reviewed this visit Vitals BP Pulse Height(growth percentile) Weight(growth percentile) Breastfeeding? BMI  
 105/70 100 5' 1\" (1.549 m) 145 lb 5 oz (65.9 kg) Yes 27.46 kg/m2 OB Status Smoking Status Recent pregnancy Never Smoker BMI and BSA Data Body Mass Index Body Surface Area  
 27.46 kg/m 2 1.68 m 2 Preferred Pharmacy Pharmacy Name Phone [facility], [address] ([address] [phone] Your Updated Medication List  
  
   
This list is accurate as of: 4/11/17  9:46 AM.  Always use your most recent med list.  
  
  
  
  
 COLACE 100 mg capsule Generic drug:  docusate sodium Take 100 mg by mouth two (2) times a day. PRENATAL DHA+COMPLETE PRENATAL -300 mg-mcg-mg Cmpk Generic drug:  PNV no.24-iron-folic acid-dha Take  by mouth. Introducing [facility]! [facility] introduces [employer] patient portal. Now you can access parts of your medical record, email your doctor's office, and request medication refills online. 1. In your internet browser, go to [email or web address] 2. Click on the First Time User? Click Here link in the Sign In box. You will see the New Member Sign Up page. 3. Enter your [employer] Access Code exactly as it appears below.  You will not need to use this code after youve completed the sign-up process. If you do not sign up before the expiration date, you must request a new code. · [employer] Access Code: [email or web address] Expires: 5/15/2017  2:33 PM 
 
4. Enter the last four digits of your Social Security Number (xxxx) and Date of Birth (mm/dd/yyyy) as indicated and click Submit. You will be taken to the next sign-up page. 5. Create a [employer] ID. This will be your [employer] login ID and cannot be changed, so think of one that is secure and easy to remember. 6. Create a [employer] password. You can change your password at any time. 7. Enter your Password Reset Question and Answer. This can be used at a later time if you forget your password. 8. Enter your e-mail address. You will receive e-mail notification when new information is available in [address]. 9. Click Sign Up. You can now view and download portions of your medical record. 10. Click the Download Summary menu link to download a portable copy of your medical information. If you have questions, please visit the Frequently Asked Questions section of the [employer] website. Remember, [employer] is NOT to be used for urgent needs. For medical emergencies, dial 911. Now available from your iPhone and Android! Please provide this summary of care documentation to your next provider. Your primary care clinician is listed as NONE. If you have any questions after today's visit, please call [phone].

## 2017-04-11 NOTE — PROGRESS NOTES
2 week postpartum visit    Date of delivery:17  Type of delivery:  Anesthesia:none with nitrous  Provider: Rancho Funez CNM  Laceration: periurethral   Baby's name: Malena   [de-identified] weight:  7lb 1 oz  Prenatal complications:none  Labor/delivery complications: none  Last pap:    Lochia: small   LMP: none  [place] score:    Feelings about birth: very positive, was happy she did natural. Thinks nitrous helped a lot. Would not have changed anything. Feeding method: Breast Problems: none    Contraception plans: IUD at 6 week post partum    Assessment:  Normal 2 week postpartum visit    Plan: 6 week postpartum visit in 4 weeks with IUD placement.  Will do pap and need f/u for pap if abnormal.

## 2017-04-14 ENCOUNTER — OFFICE VISIT (OUTPATIENT)
Dept: MIDWIFE SERVICES | Age: 27
End: 2017-04-14

## 2017-05-08 ENCOUNTER — OFFICE VISIT (OUTPATIENT)
Dept: MIDWIFE SERVICES | Age: 27
End: 2017-05-08

## 2017-05-08 VITALS
TEMPERATURE: 97.9 F | HEART RATE: 80 BPM | HEIGHT: 61 IN | SYSTOLIC BLOOD PRESSURE: 109 MMHG | WEIGHT: 138 LBS | DIASTOLIC BLOOD PRESSURE: 69 MMHG | BODY MASS INDEX: 26.06 KG/M2

## 2017-05-08 DIAGNOSIS — Z30.430 ENCOUNTER FOR INSERTION OF MIRENA IUD: Primary | ICD-10-CM

## 2017-05-08 DIAGNOSIS — Z13.31 DEPRESSION SCREENING NEGATIVE: ICD-10-CM

## 2017-05-08 NOTE — PROGRESS NOTES
Chief Complaint   Patient presents with    Post-Partum Care     6 wk PP     Visit Vitals    /69    Pulse 80    Temp 97.9 °F (36.6 °C) (Oral)    Ht 5' 1\" (1.549 m)    Wt 138 lb (62.6 kg)    Breastfeeding Yes    BMI 26.07 kg/m2     1. Have you been to the ER, urgent care clinic since your last visit? Hospitalized since your last visit? No    2. Have you seen or consulted any other health care providers outside of the [facility] since your last visit? Include any pap smears or colon screening. No    Verbal order per Aaron Saeed for PAP & Mirena insertion.     [facility] [employer]  OFFICE PROCEDURE PROGRESS NOTE        Chart reviewed for the following:   Iam Greenwood RN, have reviewed the History, Physical and updated the Allergic reactions for [address] [place] [address] Dr performed immediately prior to start of procedure:   Iam Greenwood RN, have performed the following reviews on [facility] prior to the start of the procedure:            * Patient was identified by name and date of birth   * Agreement on procedure being performed was verified  * Risks and Benefits explained to the patient  * Procedure site verified and marked as necessary  * Patient was positioned for comfort  * Consent was signed and verified     Time: 1100      Date of procedure: 5/8/2017    Procedure performed by:  Kaleigh Reich CNM    Provider assisted by: Michael Agudelo RN    Patient assisted by: self    How tolerated by patient: tolerated the procedure well with no complications    Post Procedural Pain Scale: 0 - No Hurt    Comments: none

## 2017-05-08 NOTE — PROGRESS NOTES
6 week postpartum visit    Date of delivery: 17  Type of delivery:   Anesthesia:none with Nitrous  Provider: Sachin Miranda CNM  Laceration: 1 stitch for periurethral  Baby's name: Malena  Baby's weight: 7 lbs 1 oz  Prenatal complications:  none  Labor/delivery complications: none  Last pap:  with LGSIL    Lochia: stopped 4 weeks then with spotting  LMP: none  Cumming score: 1/30 no s/s depression    Feelings about birth: positive, very difficult, not ready to repeat again  Feeding method: Breast Problems: none  Contraception plans: Mirena IUD. Placed today. Approved by insurance    Physical Exam:  See vital signs    General   alert, no distress   Thyroid not enlarged, symmetric, no tenderness/mass/nodules   Lungs   clear to auscultation bilaterally   Breasts  no masses, tenderness, nipples intact   Heart  regular rate and rhythm, S1, S2 normal, no murmur, click, rub or gallop   Abdomen   soft, non-tender. No masses,  No organomegaly. Diastasis Recti 1  cm   Pelvic exam:   VULVA: normal appearing vulva with no masses, tenderness or lesions; well-healed  VAGINA: normal appearing vagina with normal color and discharge, no lesions  CERVIX: normal appearing cervix without discharge or lesions   UTERUS: uterus is normal size, shape, consistency and nontender, retroverted  ADNEXA: normal adnexa in size, nontender and no masses  RECTAL: normal rectal, no masses. Assessment:  Normal 6 week postpartum visit    Plan: Annual with pap due, repeated today. Per Dr. Georgina Vergara will review results if abnormal f/u with colpo. Okay for Mirena today. May resume normal work activities  [employer] and exercise  Some issues with constipation and dry skin. Encouraged to increase oral fluids. And Coconut oil to skin and hair. Separate note for IUD insertion procedure note. Pt presents for IUD insertion. Questions answered, benefits/risks/side effects reviewed, consents signed. No LMP recorded.  Breast feeding, 6 weeks post partum and has not had intercourse since delivery. Vagina and vulva are normal;  no discharge is noted. Cervix normal without lesions. Uterus mildly retroverted and mobile, normal in size and shape without tenderness. Adnexa normal in size without masses or tenderness. Speculum inserted, cervix visualized. Cervix cleansed with betadine x 3. Tenaculum was applied to anterior lip of cervix. Uterus sounded to 8 cm. Mirena IUD inserted without difficulty under sterile technique as per manufacture instructions. Strings trimmed to 4 cm. Patient tolerated procedure well. Instructed on string check and to take Ibuprofen for cramping. Was also instructed on normal bleeding patterns post insertion.

## 2017-05-12 LAB
CYTOLOGIST CVX/VAG CYTO: NORMAL
CYTOLOGY CVX/VAG DOC THIN PREP: NORMAL
DX ICD CODE: NORMAL
LABCORP, 190119: NORMAL
Lab: NORMAL
OTHER STN SPEC: NORMAL
PATH REPORT.FINAL DX SPEC: NORMAL
STAT OF ADQ CVX/VAG CYTO-IMP: NORMAL

## 2017-05-18 NOTE — PROGRESS NOTES
Haylie Chase informed of pap results and need to repeat in 1 year due to hx of abnormal in past. Letter sent.